# Patient Record
Sex: MALE | Race: WHITE | Employment: OTHER | URBAN - NONMETROPOLITAN AREA
[De-identification: names, ages, dates, MRNs, and addresses within clinical notes are randomized per-mention and may not be internally consistent; named-entity substitution may affect disease eponyms.]

---

## 2017-04-20 ENCOUNTER — TELEPHONE (OUTPATIENT)
Dept: PRIMARY CARE CLINIC | Age: 67
End: 2017-04-20

## 2017-05-17 ENCOUNTER — OFFICE VISIT (OUTPATIENT)
Dept: PRIMARY CARE CLINIC | Age: 67
End: 2017-05-17
Payer: COMMERCIAL

## 2017-05-17 VITALS
BODY MASS INDEX: 26.49 KG/M2 | OXYGEN SATURATION: 98 % | HEIGHT: 72 IN | WEIGHT: 195.6 LBS | TEMPERATURE: 97.8 F | SYSTOLIC BLOOD PRESSURE: 138 MMHG | HEART RATE: 61 BPM | DIASTOLIC BLOOD PRESSURE: 84 MMHG

## 2017-05-17 DIAGNOSIS — Z11.59 NEED FOR HEPATITIS C SCREENING TEST: ICD-10-CM

## 2017-05-17 DIAGNOSIS — E78.2 MIXED HYPERLIPIDEMIA: ICD-10-CM

## 2017-05-17 DIAGNOSIS — K21.9 GASTROESOPHAGEAL REFLUX DISEASE WITHOUT ESOPHAGITIS: ICD-10-CM

## 2017-05-17 DIAGNOSIS — Z23 NEED FOR TDAP VACCINATION: ICD-10-CM

## 2017-05-17 DIAGNOSIS — I10 ESSENTIAL HYPERTENSION: ICD-10-CM

## 2017-05-17 DIAGNOSIS — Z12.5 SCREENING PSA (PROSTATE SPECIFIC ANTIGEN): ICD-10-CM

## 2017-05-17 DIAGNOSIS — E78.00 PURE HYPERCHOLESTEROLEMIA: ICD-10-CM

## 2017-05-17 DIAGNOSIS — I10 ESSENTIAL HYPERTENSION: Primary | ICD-10-CM

## 2017-05-17 DIAGNOSIS — Z23 NEED FOR PNEUMOCOCCAL VACCINE: ICD-10-CM

## 2017-05-17 LAB
ALBUMIN SERPL-MCNC: 4.7 G/DL (ref 3.5–5.2)
ALP BLD-CCNC: 60 U/L (ref 40–130)
ALT SERPL-CCNC: 31 U/L (ref 5–41)
ANION GAP SERPL CALCULATED.3IONS-SCNC: 15 MMOL/L (ref 7–19)
AST SERPL-CCNC: 33 U/L (ref 5–40)
BILIRUB SERPL-MCNC: 1 MG/DL (ref 0.2–1.2)
BUN BLDV-MCNC: 13 MG/DL (ref 8–23)
CALCIUM SERPL-MCNC: 9.3 MG/DL (ref 8.8–10.2)
CHLORIDE BLD-SCNC: 104 MMOL/L (ref 98–111)
CHOLESTEROL, TOTAL: 129 MG/DL (ref 160–199)
CO2: 23 MMOL/L (ref 22–29)
CREAT SERPL-MCNC: 0.9 MG/DL (ref 0.5–1.2)
GFR NON-AFRICAN AMERICAN: >60
GLUCOSE BLD-MCNC: 104 MG/DL (ref 74–109)
HAV IGM SER IA-ACNC: NORMAL
HDLC SERPL-MCNC: 44 MG/DL (ref 55–121)
HEPATITIS B CORE IGM ANTIBODY: NORMAL
HEPATITIS B SURFACE ANTIGEN INTERPRETATION: NORMAL
HEPATITIS C ANTIBODY INTERPRETATION: NORMAL
LDL CHOLESTEROL CALCULATED: 68 MG/DL
POTASSIUM SERPL-SCNC: 4.3 MMOL/L (ref 3.5–5)
PROSTATE SPECIFIC ANTIGEN: 1.49 NG/ML (ref 0–4)
SODIUM BLD-SCNC: 142 MMOL/L (ref 136–145)
TOTAL PROTEIN: 6.8 G/DL (ref 6.6–8.7)
TRIGL SERPL-MCNC: 85 MG/DL (ref 150–199)
TSH SERPL DL<=0.05 MIU/L-ACNC: 1.99 UIU/ML (ref 0.27–4.2)

## 2017-05-17 PROCEDURE — 90471 IMMUNIZATION ADMIN: CPT | Performed by: FAMILY MEDICINE

## 2017-05-17 PROCEDURE — 90472 IMMUNIZATION ADMIN EACH ADD: CPT | Performed by: FAMILY MEDICINE

## 2017-05-17 PROCEDURE — 90715 TDAP VACCINE 7 YRS/> IM: CPT | Performed by: FAMILY MEDICINE

## 2017-05-17 PROCEDURE — 90670 PCV13 VACCINE IM: CPT | Performed by: FAMILY MEDICINE

## 2017-05-17 PROCEDURE — 99214 OFFICE O/P EST MOD 30 MIN: CPT | Performed by: FAMILY MEDICINE

## 2017-05-17 RX ORDER — CHLORDIAZEPOXIDE HYDROCHLORIDE AND CLIDINIUM BROMIDE 5; 2.5 MG/1; MG/1
CAPSULE ORAL
Refills: 2 | COMMUNITY
Start: 2017-04-17 | End: 2020-07-10 | Stop reason: SDUPTHER

## 2017-05-17 RX ORDER — LISINOPRIL 5 MG/1
TABLET ORAL
Qty: 90 TABLET | Refills: 3 | Status: SHIPPED | OUTPATIENT
Start: 2017-05-17 | End: 2017-05-17 | Stop reason: SDUPTHER

## 2017-05-17 RX ORDER — EZETIMIBE AND SIMVASTATIN 10; 40 MG/1; MG/1
1 TABLET ORAL NIGHTLY
Qty: 90 TABLET | Refills: 3 | Status: SHIPPED | OUTPATIENT
Start: 2017-05-17 | End: 2017-05-17 | Stop reason: SDUPTHER

## 2017-05-17 RX ORDER — EZETIMIBE AND SIMVASTATIN 10; 40 MG/1; MG/1
1 TABLET ORAL NIGHTLY
Qty: 90 TABLET | Refills: 3 | Status: SHIPPED | OUTPATIENT
Start: 2017-05-17 | End: 2017-07-31 | Stop reason: SDUPTHER

## 2017-05-17 RX ORDER — LISINOPRIL 5 MG/1
TABLET ORAL
Qty: 90 TABLET | Refills: 3 | Status: SHIPPED | OUTPATIENT
Start: 2017-05-17 | End: 2018-05-17 | Stop reason: SDUPTHER

## 2017-05-17 ASSESSMENT — ENCOUNTER SYMPTOMS
COUGH: 0
SHORTNESS OF BREATH: 0
COLOR CHANGE: 0

## 2017-05-22 ENCOUNTER — TELEPHONE (OUTPATIENT)
Dept: PRIMARY CARE CLINIC | Age: 67
End: 2017-05-22

## 2017-07-31 DIAGNOSIS — E78.2 MIXED HYPERLIPIDEMIA: ICD-10-CM

## 2017-07-31 DIAGNOSIS — E78.00 PURE HYPERCHOLESTEROLEMIA: ICD-10-CM

## 2017-07-31 RX ORDER — EZETIMIBE AND SIMVASTATIN 10; 40 MG/1; MG/1
1 TABLET ORAL NIGHTLY
Qty: 90 TABLET | Refills: 3 | Status: SHIPPED | OUTPATIENT
Start: 2017-07-31 | End: 2018-05-17 | Stop reason: SDUPTHER

## 2018-05-17 ENCOUNTER — OFFICE VISIT (OUTPATIENT)
Dept: PRIMARY CARE CLINIC | Age: 68
End: 2018-05-17
Payer: COMMERCIAL

## 2018-05-17 ENCOUNTER — TELEPHONE (OUTPATIENT)
Dept: PRIMARY CARE CLINIC | Age: 68
End: 2018-05-17

## 2018-05-17 VITALS
HEART RATE: 68 BPM | SYSTOLIC BLOOD PRESSURE: 122 MMHG | TEMPERATURE: 97.3 F | OXYGEN SATURATION: 97 % | WEIGHT: 192 LBS | HEIGHT: 72 IN | BODY MASS INDEX: 26.01 KG/M2 | DIASTOLIC BLOOD PRESSURE: 70 MMHG

## 2018-05-17 DIAGNOSIS — I10 ESSENTIAL HYPERTENSION: ICD-10-CM

## 2018-05-17 DIAGNOSIS — E78.2 MIXED HYPERLIPIDEMIA: ICD-10-CM

## 2018-05-17 DIAGNOSIS — Z12.5 SCREENING PSA (PROSTATE SPECIFIC ANTIGEN): ICD-10-CM

## 2018-05-17 DIAGNOSIS — Z23 NEED FOR PROPHYLACTIC VACCINATION AGAINST STREPTOCOCCUS PNEUMONIAE (PNEUMOCOCCUS): ICD-10-CM

## 2018-05-17 DIAGNOSIS — E78.00 PURE HYPERCHOLESTEROLEMIA: ICD-10-CM

## 2018-05-17 DIAGNOSIS — Z00.00 WELLNESS EXAMINATION: Primary | ICD-10-CM

## 2018-05-17 LAB
ALBUMIN SERPL-MCNC: 4.8 G/DL (ref 3.5–5.2)
ALP BLD-CCNC: 59 U/L (ref 40–130)
ALT SERPL-CCNC: 29 U/L (ref 5–41)
ANION GAP SERPL CALCULATED.3IONS-SCNC: 14 MMOL/L (ref 7–19)
AST SERPL-CCNC: 22 U/L (ref 5–40)
BILIRUB SERPL-MCNC: 0.9 MG/DL (ref 0.2–1.2)
BUN BLDV-MCNC: 14 MG/DL (ref 8–23)
CALCIUM SERPL-MCNC: 9.4 MG/DL (ref 8.8–10.2)
CHLORIDE BLD-SCNC: 101 MMOL/L (ref 98–111)
CHOLESTEROL, TOTAL: 142 MG/DL (ref 160–199)
CO2: 25 MMOL/L (ref 22–29)
CREAT SERPL-MCNC: 1 MG/DL (ref 0.5–1.2)
GFR NON-AFRICAN AMERICAN: >60
GLUCOSE BLD-MCNC: 111 MG/DL (ref 74–109)
HDLC SERPL-MCNC: 46 MG/DL (ref 55–121)
LDL CHOLESTEROL CALCULATED: 80 MG/DL
POTASSIUM SERPL-SCNC: 4.2 MMOL/L (ref 3.5–5)
PROSTATE SPECIFIC ANTIGEN: 3.5 NG/ML (ref 0–4)
SODIUM BLD-SCNC: 140 MMOL/L (ref 136–145)
TOTAL PROTEIN: 7.3 G/DL (ref 6.6–8.7)
TRIGL SERPL-MCNC: 78 MG/DL (ref 0–149)

## 2018-05-17 PROCEDURE — 99214 OFFICE O/P EST MOD 30 MIN: CPT | Performed by: FAMILY MEDICINE

## 2018-05-17 PROCEDURE — 90732 PPSV23 VACC 2 YRS+ SUBQ/IM: CPT | Performed by: FAMILY MEDICINE

## 2018-05-17 PROCEDURE — 90471 IMMUNIZATION ADMIN: CPT | Performed by: FAMILY MEDICINE

## 2018-05-17 RX ORDER — EZETIMIBE AND SIMVASTATIN 10; 40 MG/1; MG/1
1 TABLET ORAL NIGHTLY
Qty: 90 TABLET | Refills: 3 | Status: SHIPPED | OUTPATIENT
Start: 2018-05-17 | End: 2018-12-07 | Stop reason: SINTOL

## 2018-05-17 RX ORDER — LISINOPRIL 5 MG/1
5 TABLET ORAL DAILY
Qty: 90 TABLET | Refills: 3 | Status: SHIPPED | OUTPATIENT
Start: 2018-05-17 | End: 2019-05-20 | Stop reason: SDUPTHER

## 2018-05-17 ASSESSMENT — ENCOUNTER SYMPTOMS
NAUSEA: 0
WHEEZING: 0
SHORTNESS OF BREATH: 0
EYE PAIN: 0
BLOOD IN STOOL: 0
ANAL BLEEDING: 0
COLOR CHANGE: 0
VOMITING: 0

## 2018-05-17 ASSESSMENT — PATIENT HEALTH QUESTIONNAIRE - PHQ9
1. LITTLE INTEREST OR PLEASURE IN DOING THINGS: 0
SUM OF ALL RESPONSES TO PHQ QUESTIONS 1-9: 0
2. FEELING DOWN, DEPRESSED OR HOPELESS: 0
SUM OF ALL RESPONSES TO PHQ9 QUESTIONS 1 & 2: 0

## 2018-08-17 ENCOUNTER — OFFICE VISIT (OUTPATIENT)
Dept: PRIMARY CARE CLINIC | Age: 68
End: 2018-08-17
Payer: COMMERCIAL

## 2018-08-17 VITALS
WEIGHT: 197 LBS | DIASTOLIC BLOOD PRESSURE: 72 MMHG | TEMPERATURE: 97.4 F | BODY MASS INDEX: 26.68 KG/M2 | OXYGEN SATURATION: 97 % | HEART RATE: 65 BPM | HEIGHT: 72 IN | SYSTOLIC BLOOD PRESSURE: 130 MMHG | RESPIRATION RATE: 16 BRPM

## 2018-08-17 DIAGNOSIS — R10.9 FLANK PAIN: ICD-10-CM

## 2018-08-17 DIAGNOSIS — Z87.438 HISTORY OF PROSTATITIS: ICD-10-CM

## 2018-08-17 DIAGNOSIS — R10.30 LOWER ABDOMINAL PAIN: ICD-10-CM

## 2018-08-17 DIAGNOSIS — Z23 NEED FOR ZOSTER VACCINATION: ICD-10-CM

## 2018-08-17 DIAGNOSIS — R73.09 ELEVATED GLUCOSE: ICD-10-CM

## 2018-08-17 DIAGNOSIS — R30.0 DYSURIA: Primary | ICD-10-CM

## 2018-08-17 DIAGNOSIS — R30.0 DYSURIA: ICD-10-CM

## 2018-08-17 LAB
APPEARANCE FLUID: CLEAR
BILIRUBIN, POC: NEGATIVE
BLOOD URINE, POC: NEGATIVE
CLARITY, POC: CLEAR
COLOR, POC: NORMAL
GLUCOSE URINE, POC: NEGATIVE
HBA1C MFR BLD: 6.1 % (ref 4–6)
HCT VFR BLD CALC: 45 % (ref 42–52)
HEMOGLOBIN: 14.5 G/DL (ref 14–18)
KETONES, POC: NEGATIVE
LEUKOCYTE EST, POC: NEGATIVE
MCH RBC QN AUTO: 29.2 PG (ref 27–31)
MCHC RBC AUTO-ENTMCNC: 32.2 G/DL (ref 33–37)
MCV RBC AUTO: 90.5 FL (ref 80–94)
NITRITE, POC: NEGATIVE
PDW BLD-RTO: 13.2 % (ref 11.5–14.5)
PH, POC: 6
PLATELET # BLD: 249 K/UL (ref 130–400)
PMV BLD AUTO: 9.3 FL (ref 9.4–12.4)
PROSTATE SPECIFIC ANTIGEN: 1.56 NG/ML (ref 0–4)
PROTEIN, POC: NEGATIVE
RBC # BLD: 4.97 M/UL (ref 4.7–6.1)
SPECIFIC GRAVITY, POC: 1.03
UROBILINOGEN, POC: 0.2
WBC # BLD: 5.2 K/UL (ref 4.8–10.8)

## 2018-08-17 PROCEDURE — 99213 OFFICE O/P EST LOW 20 MIN: CPT | Performed by: NURSE PRACTITIONER

## 2018-08-17 PROCEDURE — 81002 URINALYSIS NONAUTO W/O SCOPE: CPT | Performed by: NURSE PRACTITIONER

## 2018-08-17 RX ORDER — GREEN TEA/HOODIA GORDONII 315-12.5MG
1 CAPSULE ORAL DAILY
Qty: 30 TABLET | Refills: 0 | Status: SHIPPED | OUTPATIENT
Start: 2018-08-17 | End: 2019-05-20 | Stop reason: ALTCHOICE

## 2018-08-17 RX ORDER — CIPROFLOXACIN 500 MG/1
500 TABLET, FILM COATED ORAL 2 TIMES DAILY
Qty: 28 TABLET | Refills: 0 | Status: SHIPPED | OUTPATIENT
Start: 2018-08-17 | End: 2018-08-31

## 2018-08-17 ASSESSMENT — ENCOUNTER SYMPTOMS
BACK PAIN: 0
ABDOMINAL PAIN: 1

## 2018-08-17 NOTE — PROGRESS NOTES
[address] [address]  Phone:  [phone]  Fax:  [phone]      Therese Avila is a 76 y.o. male who presents today for his medical conditions/complaints as noted below. Therese vAila is c/o of [facility] Lauren Aldana i have a prostate infection\")      Chief Complaint   Patient presents with    [facility]     Jori Becker i have a prostate infection\"       HPI:     HPI    Therese Avila presents today to discuss results of PSA and he thinks he has a prostate infection. He has left lower back pain, lower abdominal pain when he bends over, and pain at tip of penis when urinating. He denies issues with urine flow, decreased output, fevers, nausea, vomiting, or diarrhea. This has been present for a few weeks. He does have a history of diverticulitis. He denies any new sexual partners. Past Medical History:   Diagnosis Date    Gallstones     GERD (gastroesophageal reflux disease)     Gout     HSN type I     HTN (hypertension)     Hyperlipidemia     Cholesterol management per pcp     Irritable bowel syndrome     Osteoarthritis         Past Surgical History:   Procedure Laterality Date    APPENDECTOMY      CHOLECYSTECTOMY      COLONOSCOPY  2014    Dr. Keon Lott Right        Social History   Substance Use Topics    Smoking status: Never Smoker    Smokeless tobacco: Never Used    Alcohol use No        Current Outpatient Prescriptions   Medication Sig Dispense Refill    zoster recombinant adjuvanted vaccine (SHINGRIX) 50 MCG SUSR injection Inject 0.5 mLs into the muscle once for 1 dose One dose now, next dose in 2-6 months.  0.5 mL 1    ciprofloxacin (CIPRO) 500 MG tablet Take 1 tablet by mouth 2 times daily for 14 days 28 tablet 0    Lactobacillus (PROBIOTIC ACIDOPHILUS) TABS Take 1 tablet by mouth daily 30 tablet 0    ezetimibe-simvastatin (VYTORIN) 10-40 MG per tablet Take 1 tablet by mouth nightly 90 tablet 3 find it unlikely that he has prostatitis due to exam.     Will consider urology consult if not improved after cipro treatment. Return in about 3 weeks (around 9/7/2018), or if symptoms worsen or fail to improve. Orders Placed This Encounter   Procedures    Urine Culture     Standing Status:   Future     Number of Occurrences:   1     Standing Expiration Date:   8/17/2019     Order Specific Question:   Specify (ex-cath, midstream, cysto, etc)? Answer:   midstream    CT ABDOMEN PELVIS W WO CONTRAST Additional Contrast? Radiologist Recommendation     Standing Status:   Future     Number of Occurrences:   1     Standing Expiration Date:   8/17/2019     Order Specific Question:   Additional Contrast?     Answer:   Radiologist Recommendation     Order Specific Question:   Reason for exam:     Answer:   lower abdominal, ll back pain, dysuria    CBC     Standing Status:   Future     Number of Occurrences:   1     Standing Expiration Date:   8/17/2019    Hemoglobin A1C     Standing Status:   Future     Number of Occurrences:   1     Standing Expiration Date:   8/17/2019    PSA Screening     Standing Status:   Future     Number of Occurrences:   1     Standing Expiration Date:   8/17/2019    POCT Urinalysis no Micro       Orders Placed This Encounter   Medications    zoster recombinant adjuvanted vaccine (SHINGRIX) 50 MCG SUSR injection     Sig: Inject 0.5 mLs into the muscle once for 1 dose One dose now, next dose in 2-6 months. Dispense:  0.5 mL     Refill:  1    ciprofloxacin (CIPRO) 500 MG tablet     Sig: Take 1 tablet by mouth 2 times daily for 14 days     Dispense:  28 tablet     Refill:  0    Lactobacillus (PROBIOTIC ACIDOPHILUS) TABS     Sig: Take 1 tablet by mouth daily     Dispense:  30 tablet     Refill:  0        Patient offered educational materials - see patient instructions. Discussed use, benefit, and side effects of prescribed medications. All patient questions answered.   Pt voiced

## 2018-08-19 LAB — URINE CULTURE, ROUTINE: NORMAL

## 2018-08-20 ENCOUNTER — TELEPHONE (OUTPATIENT)
Dept: PRIMARY CARE CLINIC | Age: 68
End: 2018-08-20

## 2018-08-24 ENCOUNTER — HOSPITAL ENCOUNTER (OUTPATIENT)
Dept: CT IMAGING | Age: 68
Discharge: HOME | End: 2018-08-24
Payer: MEDICARE

## 2018-08-24 LAB
GFR NON-AFRICAN AMERICAN: 55
PERFORMED ON: ABNORMAL
POC CREATININE: 1.3 MG/DL (ref 0.3–1.3)
POC SAMPLE TYPE: ABNORMAL

## 2018-08-24 PROCEDURE — 6360000004 HC RX CONTRAST MEDICATION: Performed by: NURSE PRACTITIONER

## 2018-08-24 PROCEDURE — 74178 CT ABD&PLV WO CNTR FLWD CNTR: CPT

## 2018-08-24 PROCEDURE — 82565 ASSAY OF CREATININE: CPT

## 2018-08-24 RX ADMIN — IOPAMIDOL 75 ML: 755 INJECTION, SOLUTION INTRAVENOUS at 12:41

## 2018-08-27 ENCOUNTER — TELEPHONE (OUTPATIENT)
Dept: PRIMARY CARE CLINIC | Age: 68
End: 2018-08-27

## 2018-08-27 NOTE — TELEPHONE ENCOUNTER
Called pt about his CT results. Spoke with Dlela pt wife who is on his HIPPA she stated understanding and will inform the pt about his results. Your CT of the abdomen showed a fatty liver. A clean, low saturated fat diet and increased activity can help with this. A high fiber diet is recommended. Avoiding diets high in seeds and nuts can also be helpful.

## 2018-09-28 ENCOUNTER — OFFICE VISIT (OUTPATIENT)
Dept: PRIMARY CARE CLINIC | Age: 68
End: 2018-09-28
Payer: MEDICARE

## 2018-09-28 VITALS
WEIGHT: 193.4 LBS | OXYGEN SATURATION: 98 % | DIASTOLIC BLOOD PRESSURE: 76 MMHG | SYSTOLIC BLOOD PRESSURE: 124 MMHG | HEIGHT: 72 IN | HEART RATE: 64 BPM | TEMPERATURE: 97.5 F | BODY MASS INDEX: 26.19 KG/M2

## 2018-09-28 DIAGNOSIS — Z23 ENCOUNTER FOR VACCINATION: ICD-10-CM

## 2018-09-28 DIAGNOSIS — Z09 FOLLOW-UP EXAM: Primary | ICD-10-CM

## 2018-09-28 PROCEDURE — G0008 ADMIN INFLUENZA VIRUS VAC: HCPCS | Performed by: NURSE PRACTITIONER

## 2018-09-28 PROCEDURE — 90662 IIV NO PRSV INCREASED AG IM: CPT | Performed by: NURSE PRACTITIONER

## 2018-09-28 PROCEDURE — 99212 OFFICE O/P EST SF 10 MIN: CPT | Performed by: NURSE PRACTITIONER

## 2018-09-28 ASSESSMENT — ENCOUNTER SYMPTOMS: BACK PAIN: 0

## 2018-09-28 NOTE — PROGRESS NOTES
After obtaining consent, and per orders of Arpita Rosas, injection of Flu given in Left arm by Ashley Longo. Patient instructed to remain in clinic for 20 minutes afterwards, and to report any adverse reaction to me immediately.

## 2018-12-07 ENCOUNTER — TELEPHONE (OUTPATIENT)
Dept: PRIMARY CARE CLINIC | Age: 68
End: 2018-12-07

## 2018-12-07 RX ORDER — EZETIMIBE AND SIMVASTATIN 10; 20 MG/1; MG/1
1 TABLET ORAL NIGHTLY
Qty: 30 TABLET | Refills: 3 | Status: SHIPPED | OUTPATIENT
Start: 2018-12-07 | End: 2018-12-10 | Stop reason: SDUPTHER

## 2018-12-10 RX ORDER — EZETIMIBE AND SIMVASTATIN 10; 20 MG/1; MG/1
1 TABLET ORAL NIGHTLY
Qty: 30 TABLET | Refills: 3 | Status: SHIPPED | OUTPATIENT
Start: 2018-12-10 | End: 2019-05-20 | Stop reason: DRUGHIGH

## 2019-04-22 DIAGNOSIS — E78.00 PURE HYPERCHOLESTEROLEMIA: ICD-10-CM

## 2019-04-22 DIAGNOSIS — E78.2 MIXED HYPERLIPIDEMIA: ICD-10-CM

## 2019-04-22 RX ORDER — EZETIMIBE AND SIMVASTATIN 10; 40 MG/1; MG/1
1 TABLET ORAL NIGHTLY
Qty: 90 TABLET | Refills: 1 | Status: SHIPPED | OUTPATIENT
Start: 2019-04-22 | End: 2019-05-20 | Stop reason: SDUPTHER

## 2019-05-20 ENCOUNTER — OFFICE VISIT (OUTPATIENT)
Dept: PRIMARY CARE CLINIC | Age: 69
End: 2019-05-20
Payer: MEDICARE

## 2019-05-20 VITALS
BODY MASS INDEX: 26.44 KG/M2 | HEART RATE: 68 BPM | HEIGHT: 72 IN | SYSTOLIC BLOOD PRESSURE: 118 MMHG | OXYGEN SATURATION: 98 % | DIASTOLIC BLOOD PRESSURE: 80 MMHG | TEMPERATURE: 98.2 F | WEIGHT: 195.2 LBS

## 2019-05-20 DIAGNOSIS — I10 ESSENTIAL HYPERTENSION: ICD-10-CM

## 2019-05-20 DIAGNOSIS — Z78.9 IMMUNE TO MEASLES: ICD-10-CM

## 2019-05-20 DIAGNOSIS — E78.00 PURE HYPERCHOLESTEROLEMIA: ICD-10-CM

## 2019-05-20 DIAGNOSIS — Z00.00 ROUTINE GENERAL MEDICAL EXAMINATION AT A HEALTH CARE FACILITY: Primary | ICD-10-CM

## 2019-05-20 LAB
ALBUMIN SERPL-MCNC: 4.7 G/DL (ref 3.5–5.2)
ALP BLD-CCNC: 59 U/L (ref 40–130)
ALT SERPL-CCNC: 26 U/L (ref 5–41)
ANION GAP SERPL CALCULATED.3IONS-SCNC: 11 MMOL/L (ref 7–19)
AST SERPL-CCNC: 24 U/L (ref 5–40)
BILIRUB SERPL-MCNC: 0.8 MG/DL (ref 0.2–1.2)
BUN BLDV-MCNC: 17 MG/DL (ref 8–23)
CALCIUM SERPL-MCNC: 9.5 MG/DL (ref 8.8–10.2)
CHLORIDE BLD-SCNC: 105 MMOL/L (ref 98–111)
CHOLESTEROL, TOTAL: 125 MG/DL (ref 160–199)
CO2: 26 MMOL/L (ref 22–29)
CREAT SERPL-MCNC: 1 MG/DL (ref 0.5–1.2)
GFR NON-AFRICAN AMERICAN: >60
GLUCOSE BLD-MCNC: 113 MG/DL (ref 74–109)
HDLC SERPL-MCNC: 36 MG/DL (ref 55–121)
LDL CHOLESTEROL CALCULATED: 70 MG/DL
POTASSIUM SERPL-SCNC: 4.7 MMOL/L (ref 3.5–5)
SODIUM BLD-SCNC: 142 MMOL/L (ref 136–145)
TOTAL PROTEIN: 7.3 G/DL (ref 6.6–8.7)
TRIGL SERPL-MCNC: 93 MG/DL (ref 0–149)

## 2019-05-20 PROCEDURE — G0438 PPPS, INITIAL VISIT: HCPCS | Performed by: FAMILY MEDICINE

## 2019-05-20 RX ORDER — LISINOPRIL 5 MG/1
5 TABLET ORAL DAILY
Qty: 90 TABLET | Refills: 3 | Status: SHIPPED | OUTPATIENT
Start: 2019-05-20 | End: 2020-06-17 | Stop reason: SDUPTHER

## 2019-05-20 RX ORDER — EZETIMIBE AND SIMVASTATIN 10; 40 MG/1; MG/1
1 TABLET ORAL NIGHTLY
Qty: 90 TABLET | Refills: 1 | Status: SHIPPED | OUTPATIENT
Start: 2019-05-20 | End: 2020-03-22

## 2019-05-20 ASSESSMENT — PATIENT HEALTH QUESTIONNAIRE - PHQ9
SUM OF ALL RESPONSES TO PHQ QUESTIONS 1-9: 0
SUM OF ALL RESPONSES TO PHQ QUESTIONS 1-9: 0

## 2019-05-20 ASSESSMENT — ANXIETY QUESTIONNAIRES: GAD7 TOTAL SCORE: 0

## 2019-05-20 ASSESSMENT — LIFESTYLE VARIABLES: HOW OFTEN DO YOU HAVE A DRINK CONTAINING ALCOHOL: 0

## 2019-05-20 NOTE — PROGRESS NOTES
care):   Patient Care Team:  Helena Mistry MD as PCP - General (Family Medicine)  Jeff Raymond MD (Cardiology)    Wt Readings from Last 3 Encounters:   05/20/19 195 lb 3.2 oz (88.5 kg)   09/28/18 193 lb 6.4 oz (87.7 kg)   08/17/18 197 lb (89.4 kg)     Vitals:    05/20/19 0845   BP: 118/80   Pulse: 68   Temp: 98.2 °F (36.8 °C)   SpO2: 98%   Weight: 195 lb 3.2 oz (88.5 kg)   Height: 6' (1.829 m)     Body mass index is 26.47 kg/m². Based upon direct observation of the patient, evaluation of cognition reveals recent and remote memory intact. General Appearance: alert and oriented to person, place and time, well developed and well- nourished, in no acute distress  Skin: warm and dry, no rash or erythema  Head: normocephalic and atraumatic  Eyes: pupils equal, round, and reactive to light, extraocular eye movements intact, conjunctivae normal  ENT: tympanic membrane, external ear and ear canal normal bilaterally, nose without deformity, nasal mucosa and turbinates normal without polyps  Neck: supple and non-tender without mass, no thyromegaly or thyroid nodules, no cervical lymphadenopathy  Pulmonary/Chest: clear to auscultation bilaterally- no wheezes, rales or rhonchi, normal air movement, no respiratory distress  Cardiovascular: normal rate, regular rhythm, normal S1 and S2, no murmurs, rubs, clicks, or gallops, distal pulses intact, no carotid bruits  Abdomen: soft, non-tender, non-distended, normal bowel sounds, no masses or organomegaly  Extremities: no cyanosis, clubbing or edema  Musculoskeletal: normal range of motion, no joint swelling, deformity or tenderness  Neurologic: reflexes normal and symmetric, no cranial nerve deficit, gait, coordination and speech normal    Patient's complete Health Risk Assessment and screening values have been reviewed and are found in Flowsheets.  The following problems were reviewed today and where indicated follow up appointments were made and/or referrals ordered. Positive Risk Factor Screenings with Interventions:     No Positive Risk Factors identified today. Personalized Preventive Plan   Current Health Maintenance Status  Immunization History   Administered Date(s) Administered    Influenza, High Dose (Fluzone 65 yrs and older) 09/28/2018    Pneumococcal 13-valent Conjugate ([id number]) 05/17/2017    Pneumococcal Polysaccharide ([id number]) 05/17/2018    Tdap (Boostrix, Adacel) 05/17/2017    Zoster Live (Zostavax) 05/17/2011    Zoster Subunit (Shingrix) 05/15/2019        Health Maintenance   Topic Date Due    Potassium monitoring  05/17/2019    Creatinine monitoring  05/17/2019    Shingles Vaccine (3 of 3) 07/10/2019    A1C test (Diabetic or Prediabetic)  08/17/2019    Lipid screen  05/17/2023    Colon cancer screen colonoscopy  05/16/2025    DTaP/Tdap/Td vaccine (2 - Td) 05/17/2027    Flu vaccine  Completed    Pneumococcal 65+ years Vaccine  Completed    Hepatitis C screen  Addressed     Recommendations for Preventive Services Due: see orders and patient instructions/AVS.  .   Recommended screening schedule for the next 5-10 years is provided to the patient in written form: see Patient Instructions/AVS.

## 2019-05-20 NOTE — PATIENT INSTRUCTIONS
Personalized Preventive Plan for Claudia Power - 5/20/2019  Medicare offers a range of preventive health benefits. Some of the tests and screenings are paid in full while other may be subject to a deductible, co-insurance, and/or copay. Some of these benefits include a comprehensive review of your medical history including lifestyle, illnesses that may run in your family, and various assessments and screenings as appropriate. After reviewing your medical record and screening and assessments performed today your provider may have ordered immunizations, labs, imaging, and/or referrals for you. A list of these orders (if applicable) as well as your Preventive Care list are included within your After Visit Summary for your review. Other Preventive Recommendations:    · A preventive eye exam performed by an eye specialist is recommended every 1-2 years to screen for glaucoma; cataracts, macular degeneration, and other eye disorders. · A preventive dental visit is recommended every 6 months. · Try to get at least 150 minutes of exercise per week or 10,000 steps per day on a pedometer . · Order or download the FREE \"Exercise & Physical Activity: Your Everyday Guide\" from [employer]. Call 1-314.501.8471 or search [employer] online. · You need 1913-1349 mg of calcium and 3595-6459 IU of vitamin D per day. It is possible to meet your calcium requirement with diet alone, but a vitamin D supplement is usually necessary to meet this goal.  · When exposed to the sun, use a sunscreen that protects against both UVA and UVB radiation with an SPF of 30 or greater. Reapply every 2 to 3 hours or after sweating, drying off with a towel, or swimming. · Always wear a seat belt when traveling in a car. Always wear a helmet when riding a bicycle or motorcycle.

## 2019-05-22 LAB — RUBEOLA (MEASLES) AB IGG: >300 AU/ML

## 2019-06-04 ENCOUNTER — TELEPHONE (OUTPATIENT)
Dept: PRIMARY CARE CLINIC | Age: 69
End: 2019-06-04

## 2019-06-04 NOTE — TELEPHONE ENCOUNTER
----- Message from VIKTOR Montes sent at 5/31/2019  4:23 PM CDT -----  Please notify patient of normal result. Looks like he is immune to measles. Labs unremarkable otherwise. No changes.

## 2019-06-04 NOTE — TELEPHONE ENCOUNTER
This [facility] called and gave the patients their lab results. Patient stated understanding.

## 2020-06-14 RX ORDER — EZETIMIBE AND SIMVASTATIN 10; 40 MG/1; MG/1
TABLET ORAL
Qty: 90 TABLET | Refills: 0 | Status: SHIPPED | OUTPATIENT
Start: 2020-06-14 | End: 2020-09-24

## 2020-06-17 RX ORDER — LISINOPRIL 5 MG/1
5 TABLET ORAL DAILY
Qty: 90 TABLET | Refills: 0 | Status: SHIPPED | OUTPATIENT
Start: 2020-06-17 | End: 2020-09-24

## 2020-07-10 ENCOUNTER — OFFICE VISIT (OUTPATIENT)
Dept: PRIMARY CARE CLINIC | Age: 70
End: 2020-07-10
Payer: MEDICARE

## 2020-07-10 VITALS
OXYGEN SATURATION: 97 % | HEIGHT: 72 IN | TEMPERATURE: 98.1 F | DIASTOLIC BLOOD PRESSURE: 84 MMHG | BODY MASS INDEX: 26.82 KG/M2 | HEART RATE: 66 BPM | WEIGHT: 198 LBS | RESPIRATION RATE: 20 BRPM | SYSTOLIC BLOOD PRESSURE: 138 MMHG

## 2020-07-10 DIAGNOSIS — I10 ESSENTIAL HYPERTENSION: ICD-10-CM

## 2020-07-10 DIAGNOSIS — Z12.5 SCREENING PSA (PROSTATE SPECIFIC ANTIGEN): ICD-10-CM

## 2020-07-10 DIAGNOSIS — E78.00 PURE HYPERCHOLESTEROLEMIA: ICD-10-CM

## 2020-07-10 DIAGNOSIS — R73.03 PREDIABETES: ICD-10-CM

## 2020-07-10 LAB
ALBUMIN SERPL-MCNC: 5.2 G/DL (ref 3.5–5.2)
ALP BLD-CCNC: 60 U/L (ref 40–130)
ALT SERPL-CCNC: 31 U/L (ref 5–41)
ANION GAP SERPL CALCULATED.3IONS-SCNC: 14 MMOL/L (ref 7–19)
AST SERPL-CCNC: 26 U/L (ref 5–40)
BILIRUB SERPL-MCNC: 0.8 MG/DL (ref 0.2–1.2)
BUN BLDV-MCNC: 15 MG/DL (ref 8–23)
CALCIUM SERPL-MCNC: 9.8 MG/DL (ref 8.8–10.2)
CHLORIDE BLD-SCNC: 106 MMOL/L (ref 98–111)
CHOLESTEROL, FASTING: 112 MG/DL (ref 160–199)
CO2: 24 MMOL/L (ref 22–29)
CREAT SERPL-MCNC: 1 MG/DL (ref 0.5–1.2)
GFR NON-AFRICAN AMERICAN: >60
GLUCOSE BLD-MCNC: 110 MG/DL (ref 74–109)
HBA1C MFR BLD: 6.1 % (ref 4–6)
HCT VFR BLD CALC: 46.5 % (ref 42–52)
HDLC SERPL-MCNC: 39 MG/DL (ref 55–121)
HEMOGLOBIN: 15.5 G/DL (ref 14–18)
LDL CHOLESTEROL CALCULATED: 56 MG/DL
MCH RBC QN AUTO: 30.2 PG (ref 27–31)
MCHC RBC AUTO-ENTMCNC: 33.3 G/DL (ref 33–37)
MCV RBC AUTO: 90.5 FL (ref 80–94)
PDW BLD-RTO: 13.2 % (ref 11.5–14.5)
PLATELET # BLD: 244 K/UL (ref 130–400)
PMV BLD AUTO: 9.8 FL (ref 9.4–12.4)
POTASSIUM SERPL-SCNC: 4.6 MMOL/L (ref 3.5–5)
PROSTATE SPECIFIC ANTIGEN: 1.59 NG/ML (ref 0–4)
RBC # BLD: 5.14 M/UL (ref 4.7–6.1)
SODIUM BLD-SCNC: 144 MMOL/L (ref 136–145)
TOTAL PROTEIN: 7.4 G/DL (ref 6.6–8.7)
TRIGLYCERIDE, FASTING: 87 MG/DL (ref 0–149)
WBC # BLD: 6 K/UL (ref 4.8–10.8)

## 2020-07-10 PROCEDURE — G0439 PPPS, SUBSEQ VISIT: HCPCS | Performed by: NURSE PRACTITIONER

## 2020-07-10 RX ORDER — CHLORDIAZEPOXIDE HYDROCHLORIDE AND CLIDINIUM BROMIDE 5; 2.5 MG/1; MG/1
1 CAPSULE ORAL DAILY
Qty: 90 CAPSULE | Refills: 3 | Status: SHIPPED | OUTPATIENT
Start: 2020-07-10 | End: 2021-07-16 | Stop reason: SDUPTHER

## 2020-07-10 ASSESSMENT — LIFESTYLE VARIABLES: HOW OFTEN DO YOU HAVE A DRINK CONTAINING ALCOHOL: 0

## 2020-07-10 ASSESSMENT — PATIENT HEALTH QUESTIONNAIRE - PHQ9
SUM OF ALL RESPONSES TO PHQ QUESTIONS 1-9: 0
SUM OF ALL RESPONSES TO PHQ QUESTIONS 1-9: 0

## 2020-07-10 NOTE — PATIENT INSTRUCTIONS
We are committed to providing you with the best care possible. In order to help us achieve these goals please remember to bring all medications, herbal products, and over the counter supplements with you to each visit. If your provider has ordered testing for you, please be sure to follow up with our office if you have not received results within 7 days after the testing took place. *If you receive a survey after visiting one of our offices, please take time to share your experience concerning your physician office visit. These surveys are confidential and no health information about you is shared. We are eager to improve for you and we are counting on your feedback to help make that happen. Personalized Preventive Plan for Candelario Jeffrey - 7/10/2020  Medicare offers a range of preventive health benefits. Some of the tests and screenings are paid in full while other may be subject to a deductible, co-insurance, and/or copay. Some of these benefits include a comprehensive review of your medical history including lifestyle, illnesses that may run in your family, and various assessments and screenings as appropriate. After reviewing your medical record and screening and assessments performed today your provider may have ordered immunizations, labs, imaging, and/or referrals for you. A list of these orders (if applicable) as well as your Preventive Care list are included within your After Visit Summary for your review. Other Preventive Recommendations:    · A preventive eye exam performed by an eye specialist is recommended every 1-2 years to screen for glaucoma; cataracts, macular degeneration, and other eye disorders. · A preventive dental visit is recommended every 6 months. · Try to get at least 150 minutes of exercise per week or 10,000 steps per day on a pedometer . · Order or download the FREE \"Exercise & Physical Activity: Your Everyday Guide\" from [employer].  Call 1-984.437.6109 or search The [employer] on [address] [place] [address]. · You need 1353-9959 mg of calcium and 5566-7424 IU of vitamin D per day. It is possible to meet your calcium requirement with diet alone, but a vitamin D supplement is usually necessary to meet this goal.  · When exposed to the sun, use a sunscreen that protects against both UVA and UVB radiation with an SPF of 30 or greater. Reapply every 2 to 3 hours or after sweating, drying off with a towel, or swimming. · Always wear a seat belt when traveling in a car. Always wear a helmet when riding a bicycle or motorcycle.

## 2020-07-10 NOTE — PROGRESS NOTES
Chief Complaint   Patient presents with   [facility] Medicare Annual Wellness Visit  Name: Phong France Date: 7/10/2020   MRN: [id number] Sex: Male   Age: 79 y.o. Ethnicity: Non-/Non    : 1950 Race: Kim Mathur is here for Medicare AWV    Screenings for behavioral, psychosocial and functional/safety risks, and cognitive dysfunction are all negative except as indicated below. These results, as well as other patient data from the [address] [facility] form, are documented in Flowsheets linked to this Encounter. No Known Allergies      Prior to Visit Medications    Medication Sig Taking? Authorizing Provider   chlordiazePOXIDE-clidinium (LIBRAX) 5-2.5 MG per capsule Take 1 capsule by mouth daily. Yes VIKTOR Goddard   lisinopril (PRINIVIL;ZESTRIL) 5 MG tablet Take 1 tablet by mouth daily Yes VIKTOR Goddard   ezetimibe-simvastatin (VYTORIN) 10-40 MG per tablet TAKE 1 TABLET BY MOUTH EVERY DAY Yes VIKTOR Goddard   dexlansoprazole (DEXILANT) 60 MG CPDR capsule Take 60 mg by mouth daily Yes Historical Provider, MD   aspirin 81 MG EC tablet Take 81 mg by mouth daily. Yes Historical Provider, MD   polycarbophil (FIBERCON) 625 MG tablet Take 625 mg by mouth daily.    Yes Historical Provider, MD         Past Medical History:   Diagnosis Date    Gallstones     GERD (gastroesophageal reflux disease)     Gout     HSN type I     HTN (hypertension)     Hyperlipidemia     Cholesterol management per pcp     Irritable bowel syndrome     Osteoarthritis        Past Surgical History:   Procedure Laterality Date    APPENDECTOMY      CHOLECYSTECTOMY      COLONOSCOPY      Dr. Robert Ley Right          Family History   Problem Relation Age of Onset    High Cholesterol Mother     Stroke Father     Other Father     Thyroid Disease Paternal Grandfather        CareTeam (Including outside providers/suppliers regularly involved in providing care):   Patient Care Team:  VIKTOR Posada as PCP - General (Nurse Practitioner)  VIKTOR Posada as PCP - [facility]  Jovani Guajardo MD (Cardiology)    Wt Readings from Last 3 Encounters:   07/10/20 198 lb (89.8 kg)   05/20/19 195 lb 3.2 oz (88.5 kg)   09/28/18 193 lb 6.4 oz (87.7 kg)     Vitals:    07/10/20 1112   BP: 138/84   Pulse: 66   Resp: 20   Temp: 98.1 °F (36.7 °C)   SpO2: 97%   Weight: 198 lb (89.8 kg)   Height: 6' (1.829 m)     Body mass index is 26.85 kg/m². Mr. Mahesh Egan presents today for an annual wellness exam.  He states he is doing well. He continues to follow-up with gastroenterology, dermatology, cardiology in [place]. He feels well today. He still works full-time. He is needing refills on Librax. Based upon direct observation of the patient, evaluation of cognition reveals recent and remote memory intact.     General Appearance: alert and oriented to person, place and time, well developed and well- nourished, in no acute distress  Skin: warm and dry, no rash or erythema  Head: normocephalic and atraumatic  Eyes: pupils equal, round, and reactive to light, extraocular eye movements intact, conjunctivae normal  ENT: tympanic membrane, external ear and ear canal normal bilaterally, nose without deformity, nasal mucosa and turbinates normal without polyps  Neck: supple and non-tender without mass, no thyromegaly or thyroid nodules, no cervical lymphadenopathy  Pulmonary/Chest: clear to auscultation bilaterally- no wheezes, rales or rhonchi, normal air movement, no respiratory distress  Cardiovascular: normal rate, regular rhythm, normal S1 and S2, no murmurs, rubs, clicks, or gallops, distal pulses intact, no carotid bruits  Abdomen: soft, non-tender, non-distended, normal bowel sounds, no masses or organomegaly  Extremities: no cyanosis, clubbing or edema  Musculoskeletal: normal range of motion, no joint swelling, deformity or tenderness  Neurologic: reflexes normal and symmetric, no cranial nerve deficit, gait, coordination and speech normal    Patient's complete Health Risk Assessment and screening values have been reviewed and are found in Flowsheets. The following problems were reviewed today and where indicated follow up appointments were made and/or referrals ordered. Positive Risk Factor Screenings with Interventions:     Depression:  Depression Unable to Assess: Functional capacity motivation limits accuracy  PHQ-2 Score: 0  PHQ-9 Total Score: 0    Personalized Preventive Plan   Current Health Maintenance Status  Immunization History   Administered Date(s) Administered    Influenza, High Dose (Fluzone 65 yrs and older) 09/28/2018    Pneumococcal Conjugate 13-valent ([id number]) 05/17/2017    Pneumococcal Polysaccharide ([id number]) 05/17/2018    Tdap (Boostrix, Adacel) 05/17/2017    Zoster Live (Zostavax) 05/17/2011    Zoster Recombinant (Shingrix) 05/15/2019, 10/10/2019        Health Maintenance   Topic Date Due    Annual Wellness Visit (AWV)  05/29/2019    A1C test (Diabetic or Prediabetic)  08/17/2019    Lipid screen  05/20/2020    Potassium monitoring  05/20/2020    Creatinine monitoring  05/20/2020    Flu vaccine (1) 09/01/2020    Colon cancer screen colonoscopy  05/16/2025    DTaP/Tdap/Td vaccine (2 - Td) 05/17/2027    Shingles Vaccine  Completed    Pneumococcal 65+ years Vaccine  Completed    Hepatitis C screen  Addressed    Hepatitis A vaccine  Aged Out    Hepatitis B vaccine  Aged Out    Hib vaccine  Aged Out    Meningococcal (ACWY) vaccine  Aged Out     Recommendations for [employer] Due: see orders and patient instructions/AVS.  . Recommended screening schedule for the next 5-10 years is provided to the patient in written form: see Patient Instructions/AVS.    Caroline Knox was seen today for medicare awv.     Diagnoses and all orders for this visit:    Routine general medical examination at a health care facility    Essential hypertension  -     CBC; Future  -     Comprehensive Metabolic Panel; Future    Pure hypercholesterolemia  -     Lipid, Fasting; Future    Gastroesophageal reflux disease without esophagitis  -     chlordiazePOXIDE-clidinium (LIBRAX) 5-2.5 MG per capsule; Take 1 capsule by mouth daily. Prediabetes  -     Hemoglobin A1C; Future    Screening PSA (prostate specific antigen)  -     PSA Screening; Future          Labs ordered. Patient can get these today since he is fasting. Refill medications. Follow-up with specialist as scheduled. Return in 1 year (on 7/10/2021), or if symptoms worsen or fail to improve, for Medicare Annual Wellness Visit in 1 year.

## 2021-07-09 ASSESSMENT — PATIENT HEALTH QUESTIONNAIRE - PHQ9
2. FEELING DOWN, DEPRESSED OR HOPELESS: 0
SUM OF ALL RESPONSES TO PHQ QUESTIONS 1-9: 0
SUM OF ALL RESPONSES TO PHQ QUESTIONS 1-9: 0
SUM OF ALL RESPONSES TO PHQ9 QUESTIONS 1 & 2: 0
SUM OF ALL RESPONSES TO PHQ QUESTIONS 1-9: 0
1. LITTLE INTEREST OR PLEASURE IN DOING THINGS: 0

## 2021-07-09 ASSESSMENT — LIFESTYLE VARIABLES
AUDIT TOTAL SCORE: INCOMPLETE
HOW OFTEN DO YOU HAVE A DRINK CONTAINING ALCOHOL: NEVER
AUDIT-C TOTAL SCORE: INCOMPLETE
HOW OFTEN DO YOU HAVE A DRINK CONTAINING ALCOHOL: 0

## 2021-07-16 ENCOUNTER — OFFICE VISIT (OUTPATIENT)
Dept: PRIMARY CARE CLINIC | Age: 71
End: 2021-07-16
Payer: MEDICARE

## 2021-07-16 VITALS
WEIGHT: 188 LBS | DIASTOLIC BLOOD PRESSURE: 76 MMHG | HEIGHT: 72 IN | OXYGEN SATURATION: 98 % | BODY MASS INDEX: 25.47 KG/M2 | SYSTOLIC BLOOD PRESSURE: 108 MMHG | HEART RATE: 60 BPM

## 2021-07-16 DIAGNOSIS — Z12.5 SCREENING PSA (PROSTATE SPECIFIC ANTIGEN): ICD-10-CM

## 2021-07-16 DIAGNOSIS — R73.03 PREDIABETES: ICD-10-CM

## 2021-07-16 DIAGNOSIS — E78.00 PURE HYPERCHOLESTEROLEMIA: ICD-10-CM

## 2021-07-16 DIAGNOSIS — I10 ESSENTIAL HYPERTENSION: Primary | ICD-10-CM

## 2021-07-16 DIAGNOSIS — R20.2 PARESTHESIA OF BOTH FEET: ICD-10-CM

## 2021-07-16 DIAGNOSIS — K21.9 GASTROESOPHAGEAL REFLUX DISEASE WITHOUT ESOPHAGITIS: ICD-10-CM

## 2021-07-16 DIAGNOSIS — Z00.00 ROUTINE GENERAL MEDICAL EXAMINATION AT A HEALTH CARE FACILITY: ICD-10-CM

## 2021-07-16 DIAGNOSIS — I10 ESSENTIAL HYPERTENSION: ICD-10-CM

## 2021-07-16 LAB
ALBUMIN SERPL-MCNC: 4.9 G/DL (ref 3.5–5.2)
ALP BLD-CCNC: 65 U/L (ref 40–130)
ALT SERPL-CCNC: 27 U/L (ref 5–41)
ANION GAP SERPL CALCULATED.3IONS-SCNC: 14 MMOL/L (ref 7–19)
AST SERPL-CCNC: 30 U/L (ref 5–40)
BILIRUB SERPL-MCNC: 1.1 MG/DL (ref 0.2–1.2)
BUN BLDV-MCNC: 17 MG/DL (ref 8–23)
CALCIUM SERPL-MCNC: 10 MG/DL (ref 8.8–10.2)
CHLORIDE BLD-SCNC: 104 MMOL/L (ref 98–111)
CHOLESTEROL, FASTING: 129 MG/DL (ref 160–199)
CO2: 25 MMOL/L (ref 22–29)
CREAT SERPL-MCNC: 0.9 MG/DL (ref 0.5–1.2)
CREATININE URINE: 245.4 MG/DL (ref 4.2–622)
FOLATE: 8.6 NG/ML (ref 4.5–32.2)
GFR AFRICAN AMERICAN: >59
GFR NON-AFRICAN AMERICAN: >60
GLUCOSE BLD-MCNC: 102 MG/DL (ref 74–109)
HBA1C MFR BLD: 5.8 % (ref 4–6)
HDLC SERPL-MCNC: 43 MG/DL (ref 55–121)
LDL CHOLESTEROL CALCULATED: 71 MG/DL
MICROALBUMIN UR-MCNC: <1.2 MG/DL (ref 0–19)
MICROALBUMIN/CREAT UR-RTO: NORMAL MG/G
POTASSIUM SERPL-SCNC: 4.4 MMOL/L (ref 3.5–5)
PROSTATE SPECIFIC ANTIGEN: 1.58 NG/ML (ref 0–4)
SODIUM BLD-SCNC: 143 MMOL/L (ref 136–145)
TOTAL PROTEIN: 7.6 G/DL (ref 6.6–8.7)
TRIGLYCERIDE, FASTING: 74 MG/DL (ref 0–149)
TSH REFLEX FT4: 2.04 UIU/ML (ref 0.35–5.5)
VITAMIN B-12: 405 PG/ML (ref 211–946)

## 2021-07-16 PROCEDURE — G0439 PPPS, SUBSEQ VISIT: HCPCS | Performed by: STUDENT IN AN ORGANIZED HEALTH CARE EDUCATION/TRAINING PROGRAM

## 2021-07-16 PROCEDURE — 99214 OFFICE O/P EST MOD 30 MIN: CPT | Performed by: STUDENT IN AN ORGANIZED HEALTH CARE EDUCATION/TRAINING PROGRAM

## 2021-07-16 RX ORDER — EZETIMIBE AND SIMVASTATIN 10; 40 MG/1; MG/1
TABLET ORAL
Qty: 90 TABLET | Refills: 3 | Status: SHIPPED | OUTPATIENT
Start: 2021-07-16 | End: 2022-07-29 | Stop reason: SDUPTHER

## 2021-07-16 RX ORDER — LISINOPRIL 5 MG/1
TABLET ORAL
Qty: 90 TABLET | Refills: 3 | Status: SHIPPED | OUTPATIENT
Start: 2021-07-16 | End: 2022-07-29 | Stop reason: SDUPTHER

## 2021-07-16 RX ORDER — CHLORDIAZEPOXIDE HYDROCHLORIDE AND CLIDINIUM BROMIDE 5; 2.5 MG/1; MG/1
1 CAPSULE ORAL DAILY
Qty: 90 CAPSULE | Refills: 3 | Status: SHIPPED | OUTPATIENT
Start: 2021-07-16

## 2021-07-16 ASSESSMENT — PATIENT HEALTH QUESTIONNAIRE - PHQ9
SUM OF ALL RESPONSES TO PHQ QUESTIONS 1-9: 0
1. LITTLE INTEREST OR PLEASURE IN DOING THINGS: 0
SUM OF ALL RESPONSES TO PHQ QUESTIONS 1-9: 0
SUM OF ALL RESPONSES TO PHQ9 QUESTIONS 1 & 2: 0
SUM OF ALL RESPONSES TO PHQ QUESTIONS 1-9: 0
2. FEELING DOWN, DEPRESSED OR HOPELESS: 0

## 2021-07-16 NOTE — PROGRESS NOTES
Patient is here for separate 2 reasons: annual wellness visit as well as acute and chronic issues. Medicare Annual Wellness Visit  Name: Vivi Lesches Date: 2021   MRN: [id number] Sex: Male   Age: 70 y.o. Ethnicity: Non-/Non    : 1950 Race: Nya Carrasquillo is here for Annual Exam    Screenings for behavioral, psychosocial and functional/safety risks, and cognitive dysfunction are all negative except as indicated below. These results, as well as other patient data from the [address] form, are documented in Flowsheets linked to this Encounter. No Known Allergies      Prior to Visit Medications    Medication Sig Taking? Authorizing Provider   lisinopril (PRINIVIL;ZESTRIL) 5 MG tablet TAKE 1 TABLET BY MOUTH EVERY DAY Yes Oswald Culp MD   ezetimibe-simvastatin (VYTORIN) 10-40 MG per tablet TAKE 1 TABLET BY MOUTH Francie Fret Yes Oswald Culp MD   chlordiazePOXIDE-clidinium (LIBRAX) 5-2.5 MG per capsule Take 1 capsule by mouth daily. Yes Oswald Culp MD   dexlansoprazole (DEXILANT) 60 MG CPDR capsule Take 60 mg by mouth daily Yes Historical Provider, MD   aspirin 81 MG EC tablet Take 81 mg by mouth daily. Yes Historical Provider, MD   polycarbophil (FIBERCON) 625 MG tablet Take 625 mg by mouth daily.    Yes Historical Provider, MD         Past Medical History:   Diagnosis Date    Gallstones     GERD (gastroesophageal reflux disease)     Gout     HSN type I     HTN (hypertension)     Hyperlipidemia     Cholesterol management per pcp     Irritable bowel syndrome     Osteoarthritis        Past Surgical History:   Procedure Laterality Date    APPENDECTOMY      CHOLECYSTECTOMY      COLONOSCOPY      Dr. Nicki Munguia Right          Family History   Problem Relation Age of Onset    High Cholesterol Mother     Stroke Father     Other Father     Thyroid Disease Paternal Grandfather        CareTeam (Including outside providers/suppliers regularly involved in providing care):   Patient Care Team:  Sylvie Loyd MD as PCP - General (Family Medicine)  Sylvie Loyd MD as PCP - [facility] Empaneled Provider  Ronan Bradley MD (Cardiology)    Wt Readings from Last 3 Encounters:   07/16/21 188 lb (85.3 kg)   07/10/20 198 lb (89.8 kg)   05/20/19 195 lb 3.2 oz (88.5 kg)     Vitals:    07/16/21 0938   BP: 108/76   Pulse: 60   SpO2: 98%   Weight: 188 lb (85.3 kg)   Height: 6' (1.829 m)     Body mass index is 25.5 kg/m². Based upon direct observation of the patient, evaluation of cognition reveals recent and remote memory intact. Patient's complete Health Risk Assessment and screening values have been reviewed and are found in Flowsheets. The following problems were reviewed today and where indicated follow up appointments were made and/or referrals ordered. Positive Risk Factor Screenings with Interventions:            General Health and ACP:  General  In general, how would you say your health is?: Good  In the past 7 days, have you experienced any of the following?  New or Increased Pain, New or Increased Fatigue, Loneliness, Social Isolation, Stress or Anger?: None of These  Do you get the social and emotional support that you need?: Yes  Do you have a Living Will?: Yes  Advance Directives     Power of  Living Will ACP-Advance Directive ACP-Power of     Not on File Not on File Not on File Not on File      General Health Risk Interventions:  · no needs identified   Health Habits/Nutrition Interventions:  · no additional needs identified       Personalized Preventive Plan   Current Health Maintenance Status  Immunization History   Administered Date(s) Administered    Influenza, High Dose (Fluzone 65 yrs and older) 09/28/2018    Pneumococcal Conjugate 13-valent ([id number]) 05/17/2017    Pneumococcal Polysaccharide ([id number]) 05/17/2018    Tdap (Boostrix, Adacel) 05/17/2017    Zoster Live (Zostavax) SERVICES  [facility]  [address] [facility] [address] [address]  Dept: [phone]  Dept Fax: [phone]  Loc: [phone]      Subjective:     Chief Complaint   Patient presents with    Annual Exam       HPI:  Viviane Diallo is a 70 y.o. 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 Lives in [place]. Does well for himself and having good quality of life w/ wife and grandchildren    HTN  -Has been well-controlled on lisinopril 5mg  -Checks regularly at home in 110-120's/60-70's  -Denies HA, visual changes, cp, palpitations, or swelling    GERD  -Sees GI, Dr. Kt Barbosa in [place], [place]. Last CLN 2014, next due 2023   -Takes dexilant and librax for sx which have been controlled    HLD  -On ezetimibe-simvastatin 10-40. Lipid levels from 2020 reviewed, at goal.    Sees Dr. Chloé Rivera, cardiologist in [place], [place] who has done vascular procedures for his legs. Notes occasional tingling in feet which he will take apap for. No h/o diabetes, only prediabetes.     ROS:   Review of Systems  Reviewed with patient and noted to be negative except that listed in HPI    PMHx:  Past Medical History:   Diagnosis Date    Gallstones     GERD (gastroesophageal reflux disease)     Gout     HSN type I     HTN (hypertension)     Hyperlipidemia     Cholesterol management per pcp     Irritable bowel syndrome     Osteoarthritis      Patient Active Problem List   Diagnosis    GERD (gastroesophageal reflux disease)    HSN type I    Gout    Gallstones    Hyperlipidemia    HTN (hypertension)       PSHx:  Past Surgical History:   Procedure Laterality Date    APPENDECTOMY      CHOLECYSTECTOMY      COLONOSCOPY  2014    Dr. Andressa Restrepo Right        PFHx:  Family History   Problem Relation Age of Onset    High Cholesterol Mother     Stroke Father     Other Father     Thyroid Disease Paternal Grandfather        SocialHx:  Social History     Tobacco Use    Smoking status: Never Smoker    Smokeless tobacco: Never Used   Substance Use Topics    Alcohol use: No       Allergies:  No Known Allergies    Medications:  Current Outpatient Medications   Medication Sig Dispense Refill    lisinopril (PRINIVIL;ZESTRIL) 5 MG tablet TAKE 1 TABLET BY MOUTH EVERY DAY 90 tablet 3    ezetimibe-simvastatin (VYTORIN) 10-40 MG per tablet TAKE 1 TABLET BY MOUTH EVERY DAY 90 tablet 3    chlordiazePOXIDE-clidinium (LIBRAX) 5-2.5 MG per capsule Take 1 capsule by mouth daily. 90 capsule 3    dexlansoprazole (DEXILANT) 60 MG CPDR capsule Take 60 mg by mouth daily      aspirin 81 MG EC tablet Take 81 mg by mouth daily.  polycarbophil (FIBERCON) 625 MG tablet Take 625 mg by mouth daily. No current facility-administered medications for this visit. Objective:   PE:  /76   Pulse 60   Ht 6' (1.829 m)   Wt 188 lb (85.3 kg)   SpO2 98%   BMI 25.50 kg/m²   Physical Exam  Constitutional:       General: He is not in acute distress. Appearance: Normal appearance. HENT:      Head: Normocephalic. Nose: Nose normal.      Mouth/Throat:      Mouth: Mucous membranes are moist.      Pharynx: Oropharynx is clear. No oropharyngeal exudate or posterior oropharyngeal erythema. Eyes:      General: No scleral icterus. Extraocular Movements: Extraocular movements intact. Conjunctiva/sclera: Conjunctivae normal.   Cardiovascular:      Rate and Rhythm: Normal rate and regular rhythm. Pulses: Normal pulses. Heart sounds: No murmur heard. Pulmonary:      Effort: Pulmonary effort is normal.      Breath sounds: Normal breath sounds. Abdominal:      General: There is no distension. Palpations: Abdomen is soft. There is no mass. Tenderness: There is no abdominal tenderness.    Musculoskeletal:         General: Normal range of motion. Cervical back: Normal range of motion. Skin:     General: Skin is warm and dry. Capillary Refill: Capillary refill takes less than 2 seconds. Neurological:      General: No focal deficit present. Mental Status: He is alert and oriented to person, place, and time. Psychiatric:         Mood and Affect: Mood normal.         Behavior: Behavior normal.         Thought Content: Thought content normal.            Assessment & Plan   Morse Boxer was seen today for annual exam.    Diagnoses and all orders for this visit:    Essential hypertension  -At goal  -     lisinopril (PRINIVIL;ZESTRIL) 5 MG tablet; TAKE 1 TABLET BY MOUTH EVERY DAY  -     Comprehensive Metabolic Panel; Future  -     Microalbumin, Ur; Future    Pure hypercholesterolemia  -     ezetimibe-simvastatin (VYTORIN) 10-40 MG per tablet; TAKE 1 TABLET BY MOUTH EVERY DAY  -     Lipid, Fasting; Future    Gastroesophageal reflux disease without esophagitis  -Continue dexilant. Followed by GI  -     chlordiazePOXIDE-clidinium (LIBRAX) 5-2.5 MG per capsule; Take 1 capsule by mouth daily. Prediabetes  -     Hemoglobin A1C; Future    Paresthesia of both feet  -     Vitamin B12 & Folate; Future  -     TSH WITH REFLEX TO FT4; Future    Screening PSA (prostate specific antigen)  -     PSA Screening; Future    Routine general medical examination at a health care facility      Return for Medicare Annual Wellness Visit in 1 year. All questions were answered. Medications, including possible adverse effects, and instructions were reviewed and  understanding was confirmed. Follow-up recommendations, including when to contact or return to office (ie; if symptoms worsen or fail to improve), were discussed and acknowledged.     Electronically signed by Mary Lou Shelton MD on 7/16/21 at 9:47 AM CDT

## 2021-07-16 NOTE — PATIENT INSTRUCTIONS
Patient Education        Learning About Foods With Healthy Fats  What foods contain healthy fats? The foods you eat contain nutrients, such as vitamins and minerals. Fat is a nutrient. Your body needs the right amount to stay healthy and work as it should. You can use the list below to help you make choices about which foods to eat. Here are some foods that contain healthy fats. Unsaturated fats and oils  · Canola oil  · Corn oil  · Flaxseed oil  · Olive oil  · Peanut oil  · Safflower oil  · Sunflower oil  Seafood  · Herring  · Lake trout  · Mackerel  · Oysters  · Baltic  · Sardines  Nuts and seeds  · Almonds  · Norm seeds  · Flaxseeds (ground)  · Hazelnuts  · Nut butters (such as peanut and almond)  · Pumpkin seeds  · Sunflower seeds  · Walnuts  Fruits  · Avocados  · Olives  Work with your doctor to find out how much of this nutrient you need. Depending on your health, you may need more or less of it in your diet. Where can you learn more? Go to [email or web address] org and sign in to your [employer] account. Enter F360 in the [employer] box to learn more about \"Learning About Foods With Healthy Fats. \"     If you do not have an account, please click on the \"Sign Up Now\" link. Current as of: December 17, 2020               Content Version: 12.9  © 0734-6097 Healthwise, Incorporated. Care instructions adapted under license by [facility] ([facility]). If you have questions about a medical condition or this instruction, always ask your healthcare professional. [place] [address] any warranty or liability for your use of this information. Personalized Preventive Plan for Natasha Fry - 7/16/2021  Medicare offers a range of preventive health benefits. Some of the tests and screenings are paid in full while other may be subject to a deductible, co-insurance, and/or copay.     Some of these benefits include a comprehensive review of your medical history including

## 2022-07-22 SDOH — HEALTH STABILITY: PHYSICAL HEALTH: ON AVERAGE, HOW MANY DAYS PER WEEK DO YOU ENGAGE IN MODERATE TO STRENUOUS EXERCISE (LIKE A BRISK WALK)?: 4 DAYS

## 2022-07-22 SDOH — HEALTH STABILITY: PHYSICAL HEALTH: ON AVERAGE, HOW MANY MINUTES DO YOU ENGAGE IN EXERCISE AT THIS LEVEL?: 60 MIN

## 2022-07-22 ASSESSMENT — LIFESTYLE VARIABLES
HOW MANY STANDARD DRINKS CONTAINING ALCOHOL DO YOU HAVE ON A TYPICAL DAY: 0
HOW OFTEN DO YOU HAVE A DRINK CONTAINING ALCOHOL: NEVER
HOW MANY STANDARD DRINKS CONTAINING ALCOHOL DO YOU HAVE ON A TYPICAL DAY: PATIENT DOES NOT DRINK
HOW OFTEN DO YOU HAVE SIX OR MORE DRINKS ON ONE OCCASION: 1
HOW OFTEN DO YOU HAVE A DRINK CONTAINING ALCOHOL: 1

## 2022-07-22 ASSESSMENT — PATIENT HEALTH QUESTIONNAIRE - PHQ9
SUM OF ALL RESPONSES TO PHQ QUESTIONS 1-9: 0
SUM OF ALL RESPONSES TO PHQ QUESTIONS 1-9: 0
2. FEELING DOWN, DEPRESSED OR HOPELESS: 0
SUM OF ALL RESPONSES TO PHQ QUESTIONS 1-9: 0
SUM OF ALL RESPONSES TO PHQ QUESTIONS 1-9: 0
SUM OF ALL RESPONSES TO PHQ9 QUESTIONS 1 & 2: 0
1. LITTLE INTEREST OR PLEASURE IN DOING THINGS: 0

## 2022-07-29 ENCOUNTER — OFFICE VISIT (OUTPATIENT)
Dept: PRIMARY CARE CLINIC | Age: 72
End: 2022-07-29
Payer: MEDICARE

## 2022-07-29 VITALS
RESPIRATION RATE: 20 BRPM | BODY MASS INDEX: 25.6 KG/M2 | DIASTOLIC BLOOD PRESSURE: 78 MMHG | OXYGEN SATURATION: 96 % | WEIGHT: 189 LBS | SYSTOLIC BLOOD PRESSURE: 128 MMHG | TEMPERATURE: 98.7 F | HEART RATE: 79 BPM | HEIGHT: 72 IN

## 2022-07-29 DIAGNOSIS — Z00.00 MEDICARE ANNUAL WELLNESS VISIT, SUBSEQUENT: ICD-10-CM

## 2022-07-29 DIAGNOSIS — E78.00 PURE HYPERCHOLESTEROLEMIA: ICD-10-CM

## 2022-07-29 DIAGNOSIS — R73.03 PREDIABETES: ICD-10-CM

## 2022-07-29 DIAGNOSIS — I10 ESSENTIAL HYPERTENSION: ICD-10-CM

## 2022-07-29 DIAGNOSIS — R20.2 PARESTHESIA OF BOTH FEET: ICD-10-CM

## 2022-07-29 DIAGNOSIS — Z12.5 SCREENING PSA (PROSTATE SPECIFIC ANTIGEN): ICD-10-CM

## 2022-07-29 DIAGNOSIS — K21.9 GASTROESOPHAGEAL REFLUX DISEASE WITHOUT ESOPHAGITIS: ICD-10-CM

## 2022-07-29 DIAGNOSIS — I10 ESSENTIAL HYPERTENSION: Primary | ICD-10-CM

## 2022-07-29 LAB
ALBUMIN SERPL-MCNC: 4.7 G/DL (ref 3.5–5.2)
ALP BLD-CCNC: 72 U/L (ref 40–130)
ALT SERPL-CCNC: 42 U/L (ref 5–41)
ANION GAP SERPL CALCULATED.3IONS-SCNC: 10 MMOL/L (ref 7–19)
AST SERPL-CCNC: 32 U/L (ref 5–40)
BILIRUB SERPL-MCNC: 0.7 MG/DL (ref 0.2–1.2)
BUN BLDV-MCNC: 12 MG/DL (ref 8–23)
CALCIUM SERPL-MCNC: 9.6 MG/DL (ref 8.8–10.2)
CHLORIDE BLD-SCNC: 105 MMOL/L (ref 98–111)
CHOLESTEROL, FASTING: 130 MG/DL (ref 160–199)
CO2: 27 MMOL/L (ref 22–29)
CREAT SERPL-MCNC: 1 MG/DL (ref 0.5–1.2)
CREATININE URINE: 225.6 MG/DL (ref 4.2–622)
FOLATE: 10 NG/ML (ref 4.5–32.2)
GFR AFRICAN AMERICAN: >59
GFR NON-AFRICAN AMERICAN: >60
GLUCOSE BLD-MCNC: 125 MG/DL (ref 74–109)
HBA1C MFR BLD: 6.5 % (ref 4–6)
HDLC SERPL-MCNC: 37 MG/DL (ref 55–121)
LDL CHOLESTEROL CALCULATED: 65 MG/DL
MICROALBUMIN UR-MCNC: <1.2 MG/DL (ref 0–19)
MICROALBUMIN/CREAT UR-RTO: NORMAL MG/G
POTASSIUM SERPL-SCNC: 4.3 MMOL/L (ref 3.5–5)
PROSTATE SPECIFIC ANTIGEN: 1.5 NG/ML (ref 0–4)
SODIUM BLD-SCNC: 142 MMOL/L (ref 136–145)
TOTAL PROTEIN: 6.9 G/DL (ref 6.6–8.7)
TRIGLYCERIDE, FASTING: 140 MG/DL (ref 0–149)
TSH REFLEX FT4: 1.8 UIU/ML (ref 0.35–5.5)
VITAMIN B-12: 422 PG/ML (ref 211–946)

## 2022-07-29 PROCEDURE — G0439 PPPS, SUBSEQ VISIT: HCPCS | Performed by: STUDENT IN AN ORGANIZED HEALTH CARE EDUCATION/TRAINING PROGRAM

## 2022-07-29 PROCEDURE — 1123F ACP DISCUSS/DSCN MKR DOCD: CPT | Performed by: STUDENT IN AN ORGANIZED HEALTH CARE EDUCATION/TRAINING PROGRAM

## 2022-07-29 PROCEDURE — 99214 OFFICE O/P EST MOD 30 MIN: CPT | Performed by: STUDENT IN AN ORGANIZED HEALTH CARE EDUCATION/TRAINING PROGRAM

## 2022-07-29 RX ORDER — LISINOPRIL 5 MG/1
TABLET ORAL
Qty: 90 TABLET | Refills: 3 | Status: SHIPPED | OUTPATIENT
Start: 2022-07-29

## 2022-07-29 RX ORDER — EZETIMIBE AND SIMVASTATIN 10; 40 MG/1; MG/1
TABLET ORAL
Qty: 90 TABLET | Refills: 3 | Status: SHIPPED | OUTPATIENT
Start: 2022-07-29

## 2022-07-29 SDOH — ECONOMIC STABILITY: FOOD INSECURITY: WITHIN THE PAST 12 MONTHS, YOU WORRIED THAT YOUR FOOD WOULD RUN OUT BEFORE YOU GOT MONEY TO BUY MORE.: NEVER TRUE

## 2022-07-29 SDOH — ECONOMIC STABILITY: FOOD INSECURITY: WITHIN THE PAST 12 MONTHS, THE FOOD YOU BOUGHT JUST DIDN'T LAST AND YOU DIDN'T HAVE MONEY TO GET MORE.: NEVER TRUE

## 2022-07-29 ASSESSMENT — SOCIAL DETERMINANTS OF HEALTH (SDOH): HOW HARD IS IT FOR YOU TO PAY FOR THE VERY BASICS LIKE FOOD, HOUSING, MEDICAL CARE, AND HEATING?: NOT HARD AT ALL

## 2022-07-29 NOTE — PATIENT INSTRUCTIONS
Personalized Preventive Plan for [facility] - 7/29/2022  Medicare offers a range of preventive health benefits. Some of the tests and screenings are paid in full while other may be subject to a deductible, co-insurance, and/or copay. Some of these benefits include a comprehensive review of your medical history including lifestyle, illnesses that may run in your family, and various assessments and screenings as appropriate. After reviewing your medical record and screening and assessments performed today your provider may have ordered immunizations, labs, imaging, and/or referrals for you. A list of these orders (if applicable) as well as your Preventive Care list are included within your After Visit Summary for your review. Other Preventive Recommendations:    A preventive eye exam performed by an eye specialist is recommended every 1-2 years to screen for glaucoma; cataracts, macular degeneration, and other eye disorders. A preventive dental visit is recommended every 6 months. Try to get at least 150 minutes of exercise per week or 10,000 steps per day on a pedometer . Order or download the FREE \"Exercise & Physical Activity: Your Everyday Guide\" from [employer]. Call 3-974.200.7489 or search [employer] online. You need 5794-9279 mg of calcium and 9767-0627 IU of vitamin D per day. It is possible to meet your calcium requirement with diet alone, but a vitamin D supplement is usually necessary to meet this goal.  When exposed to the sun, use a sunscreen that protects against both UVA and UVB radiation with an SPF of 30 or greater. Reapply every 2 to 3 hours or after sweating, drying off with a towel, or swimming. Always wear a seat belt when traveling in a car. Always wear a helmet when riding a bicycle or motorcycle.

## 2022-07-29 NOTE — PROGRESS NOTES
Medicare Annual Wellness Visit    Jude Harvey is here for Medicare AWV    Assessment & Plan   Essential hypertension  -At goal. Continue current medication regimen  -     lisinopril (PRINIVIL;ZESTRIL) 5 MG tablet; TAKE 1 TABLET BY MOUTH EVERY DAY, Disp-90 tablet, R-3DX Code Needed  . Normal  -     ezetimibe-simvastatin (VYTORIN) 10-40 MG per tablet; TAKE 1 TABLET BY MOUTH EVERY DAY, Disp-90 tablet, R-3DX Code Needed  . Normal  -     Microalbumin, Ur; Future  -     Comprehensive Metabolic Panel; Future  Pure hypercholesterolemia  -     ezetimibe-simvastatin (VYTORIN) 10-40 MG per tablet; TAKE 1 TABLET BY MOUTH EVERY DAY, Disp-90 tablet, R-3DX Code Needed  . Normal  -     Lipid, Fasting; Future  Gastroesophageal reflux disease without esophagitis  Screening PSA (prostate specific antigen)  -     PSA Screening; Future  Prediabetes  -     Hemoglobin A1C; Future  Paresthesia of both feet  -     TSH with Reflex to FT4; Future  -     Vitamin B12 & Folate; Future    Recommendations for Preventive Services Due: see orders and patient instructions/AVS.  Recommended screening schedule for the next 5-10 years is provided to the patient in written form: see Patient Instructions/AVS.     No follow-ups on file. Subjective   The following acute and/or chronic problems were also addressed today:    HTN-Needing refill of lisinopil 5mg. BP well controlled with this  HLD-On ezetimibe/simvastatin 10/40. Lipids from 2021 at goal  GERD-Sx controlled on dexilant. Recent CLN/EGD done w/ outside GI. Pt reports both reports were normal.     Patient's complete Health Risk Assessment and screening values have been reviewed and are found in Flowsheets. The following problems were reviewed today and where indicated follow up appointments were made and/or referrals ordered.     Positive Risk Factor Screenings with Interventions:             General Health and ACP:  General  In general, how would you say your health is?: Very Good  In the past 7 days, have you experienced any of the following: New or Increased Pain, New or Increased Fatigue, Loneliness, Social Isolation, Stress or Anger?: No  Do you get the social and emotional support that you need?: Yes  Do you have a Living Will?: Yes    Advance Directives       Power of Quentin Joyner Will ACP-Advance Directive ACP-Power of     Not on File Not on File Not on File Not on File        General Health Risk Interventions:  No further interventions identified              Objective   Vitals:    07/29/22 0830   BP: 128/78   Pulse: 79   Resp: 20   Temp: 98.7 °F (37.1 °C)   TempSrc: Temporal   SpO2: 96%   Weight: 189 lb (85.7 kg)   Height: 6' (1.829 m)      Body mass index is 25.63 kg/m².       General Appearance: alert and oriented to person, place and time, well developed and well- nourished, in no acute distress  Skin: warm and dry, no rash or erythema  Head: normocephalic and atraumatic  Eyes: pupils equal, round, and reactive to light, extraocular eye movements intact, conjunctivae normal  ENT: tympanic membrane, external ear and ear canal normal bilaterally, nose without deformity, nasal mucosa and turbinates normal without polyps  Neck: supple and non-tender without mass, no thyromegaly or thyroid nodules, no cervical lymphadenopathy  Pulmonary/Chest: clear to auscultation bilaterally- no wheezes, rales or rhonchi, normal air movement, no respiratory distress  Cardiovascular: normal rate, regular rhythm, normal S1 and S2, no murmurs, rubs, clicks, or gallops, distal pulses intact, no carotid bruits  Abdomen: soft, non-tender, non-distended, normal bowel sounds, no masses or organomegaly  Extremities: no cyanosis, clubbing or edema  Musculoskeletal: normal range of motion, no joint swelling, deformity or tenderness  Neurologic: reflexes normal and symmetric, no cranial nerve deficit, gait, coordination and speech normal  Skin: warm and dry, no rash or erythema  Head: normocephalic and

## 2022-07-29 NOTE — PROGRESS NOTES
Went to [facility] last week to have colonoscopy which was perfect per patient and had a EGD and had no change.

## 2022-11-11 ENCOUNTER — OFFICE VISIT (OUTPATIENT)
Dept: PRIMARY CARE CLINIC | Age: 72
End: 2022-11-11
Payer: MEDICARE

## 2022-11-11 VITALS
SYSTOLIC BLOOD PRESSURE: 108 MMHG | TEMPERATURE: 97.7 F | HEART RATE: 64 BPM | DIASTOLIC BLOOD PRESSURE: 74 MMHG | WEIGHT: 161.4 LBS | HEIGHT: 72 IN | BODY MASS INDEX: 21.86 KG/M2 | OXYGEN SATURATION: 94 %

## 2022-11-11 DIAGNOSIS — Z76.89 ENCOUNTER TO ESTABLISH CARE WITH NEW DOCTOR: Primary | ICD-10-CM

## 2022-11-11 DIAGNOSIS — I10 PRIMARY HYPERTENSION: ICD-10-CM

## 2022-11-11 DIAGNOSIS — R73.03 PREDIABETES: ICD-10-CM

## 2022-11-11 DIAGNOSIS — E78.5 HYPERLIPIDEMIA, UNSPECIFIED HYPERLIPIDEMIA TYPE: ICD-10-CM

## 2022-11-11 DIAGNOSIS — K21.9 GASTROESOPHAGEAL REFLUX DISEASE WITHOUT ESOPHAGITIS: ICD-10-CM

## 2022-11-11 LAB — HBA1C MFR BLD: 6 %

## 2022-11-11 PROCEDURE — 99213 OFFICE O/P EST LOW 20 MIN: CPT | Performed by: FAMILY MEDICINE

## 2022-11-11 PROCEDURE — 3074F SYST BP LT 130 MM HG: CPT | Performed by: FAMILY MEDICINE

## 2022-11-11 PROCEDURE — 83036 HEMOGLOBIN GLYCOSYLATED A1C: CPT | Performed by: FAMILY MEDICINE

## 2022-11-11 PROCEDURE — 1123F ACP DISCUSS/DSCN MKR DOCD: CPT | Performed by: FAMILY MEDICINE

## 2022-11-11 PROCEDURE — 3078F DIAST BP <80 MM HG: CPT | Performed by: FAMILY MEDICINE

## 2022-11-11 ASSESSMENT — ENCOUNTER SYMPTOMS
NAUSEA: 0
BACK PAIN: 0
WHEEZING: 0
EYE PAIN: 0
CHEST TIGHTNESS: 0
ABDOMINAL PAIN: 0
SHORTNESS OF BREATH: 0
COUGH: 0
SORE THROAT: 0
DIARRHEA: 0
TROUBLE SWALLOWING: 0
VOMITING: 0
CONSTIPATION: 0

## 2022-11-11 NOTE — PROGRESS NOTES
[address] [facility] CARE  [address] [address] [place] [address]  Dept: [phone]  Dept Fax: [phone]  Loc: [phone]      Subjective:     Chief Complaint   Patient presents with    [facility]     Formerly with Dr. Devries Other        HPI:  Dede Gonzalez is a 67 y.o. male presents today  to get establish as new patient. He is a former Dr Robert Westfall patient. Last time he was here, his A1c was noted to be elevated. He states he has been working on his diet and he continues to stay active. He still works fulltime as an . Repeat A1c today is 6%    ROS:   Review of Systems   Constitutional:  Negative for appetite change, chills, fatigue and fever. HENT:  Negative for congestion, ear pain, hearing loss, nosebleeds, sore throat and trouble swallowing. Eyes:  Negative for pain and visual disturbance. Respiratory:  Negative for cough, chest tightness, shortness of breath and wheezing. Cardiovascular:  Negative for chest pain, palpitations and leg swelling. Gastrointestinal:  Negative for abdominal pain, constipation, diarrhea, nausea and vomiting. Endocrine: Negative for cold intolerance, heat intolerance, polydipsia, polyphagia and polyuria. Genitourinary:  Negative for difficulty urinating, dysuria, frequency, hematuria and urgency. Musculoskeletal:  Negative for arthralgias, back pain, gait problem, joint swelling, myalgias, neck pain and neck stiffness. Skin:  Negative for pallor and rash. Allergic/Immunologic: Negative for environmental allergies and food allergies. Neurological:  Negative for dizziness, weakness and numbness. Hematological:  Negative for adenopathy. Does not bruise/bleed easily. Psychiatric/Behavioral:  Negative for agitation, behavioral problems, decreased concentration and sleep disturbance. The patient is not nervous/anxious.       PMHx:  Past Medical History:   Diagnosis Date    Gallstones Gallstones     GERD (gastroesophageal reflux disease)     Gout     HSN type I     HTN (hypertension)     Hyperlipidemia     Cholesterol management per pcp     Irritable bowel syndrome     Osteoarthritis      Patient Active Problem List   Diagnosis    GERD (gastroesophageal reflux disease)    HSN type I    Gout    Hyperlipidemia    HTN (hypertension)       PSHx:  Past Surgical History:   Procedure Laterality Date    APPENDECTOMY      CHOLECYSTECTOMY      COLONOSCOPY  2014    Dr. Brianna Morgan Right        PFHx:  Family History   Problem Relation Age of Onset    High Cholesterol Mother     Stroke Father     Other Father     Thyroid Disease Paternal Grandfather        SocialHx:  Social History     Tobacco Use    Smoking status: Never    Smokeless tobacco: Never   Substance Use Topics    Alcohol use: No       Allergies:  No Known Allergies    Medications:  Current Outpatient Medications   Medication Sig Dispense Refill    lisinopril (PRINIVIL;ZESTRIL) 5 MG tablet TAKE 1 TABLET BY MOUTH EVERY DAY 90 tablet 3    ezetimibe-simvastatin (VYTORIN) 10-40 MG per tablet TAKE 1 TABLET BY MOUTH EVERY DAY 90 tablet 3    chlordiazePOXIDE-clidinium (LIBRAX) 5-2.5 MG per capsule Take 1 capsule by mouth daily. 90 capsule 3    dexlansoprazole (DEXILANT) 60 MG CPDR delayed release capsule Take 60 mg by mouth daily      aspirin 81 MG EC tablet Take 81 mg by mouth daily. polycarbophil (FIBERCON) 625 MG tablet Take 625 mg by mouth daily. No current facility-administered medications for this visit. Objective:   PE:  /74   Pulse 64   Temp 97.7 °F (36.5 °C)   Ht 6' (1.829 m)   Wt 161 lb 6.4 oz (73.2 kg)   SpO2 94%   BMI 21.89 kg/m²   Physical Exam  Constitutional:       General: He is not in acute distress. Appearance: Normal appearance. HENT:      Head: Normocephalic.       Right Ear: External ear normal.      Left Ear: External ear normal.      Nose: Nose normal. Mouth/Throat:      Mouth: Mucous membranes are moist.      Pharynx: Oropharynx is clear. No oropharyngeal exudate or posterior oropharyngeal erythema. Eyes:      General: No scleral icterus. Extraocular Movements: Extraocular movements intact. Conjunctiva/sclera: Conjunctivae normal.   Cardiovascular:      Rate and Rhythm: Normal rate and regular rhythm. Pulses: Normal pulses. Heart sounds: Normal heart sounds. No murmur heard. Pulmonary:      Effort: Pulmonary effort is normal.      Breath sounds: Normal breath sounds. Abdominal:      General: There is no distension. Palpations: Abdomen is soft. There is no mass. Tenderness: There is no abdominal tenderness. Musculoskeletal:         General: No swelling or tenderness. Normal range of motion. Cervical back: Neck supple. Skin:     General: Skin is warm and dry. Capillary Refill: Capillary refill takes less than 2 seconds. Findings: No lesion. Neurological:      General: No focal deficit present. Mental Status: He is alert and oriented to person, place, and time. Motor: No weakness. Psychiatric:         Mood and Affect: Mood normal.         Behavior: Behavior normal.         Thought Content: Thought content normal.          Assessment & Plan   Johnny Clay was seen today for establish care.     Diagnoses and all orders for this visit:    Encounter to establish care with new doctor    Prediabetes  -     POCT glycosylated hemoglobin (Hb A1C)    Gastroesophageal reflux disease without esophagitis    Primary hypertension    Hyperlipidemia, unspecified hyperlipidemia type    Continue present care and management  Continue all maintenance medications  Encouraged to continue healthy lifestyle change  Engage in regular exercise daily -30 minutes a day as tolerated  Stay well  hydrated - drink at least 64 oz of fluid a day  Eat 6 servings of fruit and vegetables daily  Keep scheduled follow-up appt with me   Call with new concerns     Return in about 6 months (around 5/11/2023) for Medicare Wellness Visit, med check. All questions were answered. Medications, including possible adverse effects, and instructions were reviewed and  understanding was confirmed. Follow-up recommendations, including when to contact or return to office (ie; if symptoms worsen or fail to improve), were discussed and acknowledged.     Electronically signed by Festus Corley MD on 11/11/22 at 8:24 AM CST

## 2023-04-26 ENCOUNTER — HOSPITAL ENCOUNTER (EMERGENCY)
Age: 73
Discharge: HOME | End: 2023-04-27
Attending: EMERGENCY MEDICINE
Payer: MEDICARE

## 2023-04-26 VITALS
TEMPERATURE: 97.6 F | DIASTOLIC BLOOD PRESSURE: 79 MMHG | BODY MASS INDEX: 26.28 KG/M2 | RESPIRATION RATE: 20 BRPM | OXYGEN SATURATION: 97 % | HEIGHT: 72 IN | HEART RATE: 59 BPM | WEIGHT: 194 LBS | SYSTOLIC BLOOD PRESSURE: 138 MMHG

## 2023-04-26 DIAGNOSIS — R51.9 NONINTRACTABLE HEADACHE, UNSPECIFIED CHRONICITY PATTERN, UNSPECIFIED HEADACHE TYPE: ICD-10-CM

## 2023-04-26 DIAGNOSIS — R04.0 EPISTAXIS: Primary | ICD-10-CM

## 2023-04-26 PROCEDURE — 99284 EMERGENCY DEPT VISIT MOD MDM: CPT

## 2023-04-26 ASSESSMENT — PAIN - FUNCTIONAL ASSESSMENT: PAIN_FUNCTIONAL_ASSESSMENT: NONE - DENIES PAIN

## 2023-04-27 ENCOUNTER — TELEPHONE (OUTPATIENT)
Dept: ENT CLINIC | Age: 73
End: 2023-04-27

## 2023-04-27 ENCOUNTER — APPOINTMENT (OUTPATIENT)
Dept: CT IMAGING | Age: 73
End: 2023-04-27
Payer: MEDICARE

## 2023-04-27 ENCOUNTER — TELEPHONE (OUTPATIENT)
Dept: PRIMARY CARE CLINIC | Age: 73
End: 2023-04-27

## 2023-04-27 DIAGNOSIS — R04.0 NASAL BLEEDING: Primary | ICD-10-CM

## 2023-04-27 LAB
ERYTHROCYTE [DISTWIDTH] IN BLOOD BY AUTOMATED COUNT: 13.1 % (ref 11.5–14.5)
HCT VFR BLD AUTO: 46.4 % (ref 42–52)
HGB BLD-MCNC: 15.1 G/DL (ref 14–18)
INR PPP: 0.99 (ref 0.88–1.18)
MCH RBC QN AUTO: 29.8 PG (ref 27–31)
MCHC RBC AUTO-ENTMCNC: 32.5 G/DL (ref 33–37)
MCV RBC AUTO: 91.7 FL (ref 80–94)
PLATELET # BLD AUTO: 230 K/UL (ref 130–400)
PMV BLD AUTO: 9 FL (ref 9.4–12.4)
PROTHROMBIN TIME: 13 SEC (ref 12–14.6)
RBC # BLD AUTO: 5.06 M/UL (ref 4.7–6.1)
WBC # BLD AUTO: 7.2 K/UL (ref 4.8–10.8)

## 2023-04-27 PROCEDURE — 85610 PROTHROMBIN TIME: CPT

## 2023-04-27 PROCEDURE — 36415 COLL VENOUS BLD VENIPUNCTURE: CPT

## 2023-04-27 PROCEDURE — 85027 COMPLETE CBC AUTOMATED: CPT

## 2023-04-27 PROCEDURE — 70450 CT HEAD/BRAIN W/O DYE: CPT

## 2023-04-27 PROCEDURE — 70450 CT HEAD/BRAIN W/O DYE: CPT | Performed by: RADIOLOGY

## 2023-04-27 ASSESSMENT — ENCOUNTER SYMPTOMS
DIARRHEA: 0
EYE PAIN: 0
VOMITING: 0
SHORTNESS OF BREATH: 0
FACIAL SWELLING: 0
SORE THROAT: 0
ABDOMINAL PAIN: 0
SINUS PAIN: 0

## 2023-04-27 NOTE — TELEPHONE ENCOUNTER
Pt called, was in ER last night, Dr. Abel  is sending a referral, I explained that we have to receive that, and we will call once they have the information. He said he has had constant nosebleeds and would like to be seen as soon as possible. Thanks!

## 2023-04-27 NOTE — TELEPHONE ENCOUNTER
----- Message from Dave Bales sent at 4/27/2023  8:05 AM CDT -----  Subject: Referral Request    Reason for referral request? ENT- nose bleeds  Provider patient wants to be referred to(if known):     Provider Phone Number(if known):     Additional Information for Provider? has been to the ER a few time for   nose bleeds 5-6 times a day for about 30 mins (right nostril)   ---------------------------------------------------------------------------  --------------  Gisselle Damon [facility]    [phone]; OK to leave message on voicemail  ---------------------------------------------------------------------------  --------------

## 2023-04-27 NOTE — ED PROVIDER NOTES
[facility]  eMERGENCY dEPARTMENT eNCOUnter      Pt Name: Kailee Barroso  MRN: [id number]  [place] 1950  Date of evaluation: 4/26/2023  Provider: Denia Christensen MD    CHIEF COMPLAINT       Chief Complaint   Patient presents with    Epistaxis     Since Sunday. Pt was seen in [facility] and sent home with A[facility]in spray[facility] No bleeding at this time. HISTORY OF PRESENT ILLNESS   (Location/Symptom, Timing/Onset,Context/Setting, Quality, Duration, Modifying Factors, Severity)  Note limiting factors. Kailee Barroso is a 67 y.o. male who presents to the emergency department due to intermittent epistaxis from the right nare since Sunday. Was seen at outside facility when symptoms started and put on Afrin. Has had some intermittent bleeding from the right nare since then that has been much milder. Has not used Afrin today. Has had some mild discomfort in the back of his head on the right. No injury. This is described as very mild. No vision changes numbness or weakness. No ear pain. No fevers. No neck pain or stiffness. No chest pain or shortness of breath. When he was seen at outside facility was told if he had any further bleeding he would need to see ENT and to come to a hospital with ENT coverage which is why he came here tonight. No history of any bleeding issues in the past.  No other bleeding problems. On a baby aspirin but has stopped this since the nosebleed started. HPI    NursingNotes were reviewed. REVIEW OF SYSTEMS    (2-9 systems for level 4, 10 or more for level 5)     Review of Systems   Constitutional:  Negative for fever. HENT:  Positive for nosebleeds. Negative for ear pain, facial swelling, sinus pain and sore throat. Eyes:  Negative for pain and visual disturbance. Respiratory:  Negative for shortness of breath. Cardiovascular:  Negative for chest pain and palpitations.    Gastrointestinal:  Negative for abdominal pain,

## 2023-04-28 ENCOUNTER — OFFICE VISIT (OUTPATIENT)
Dept: ENT CLINIC | Age: 73
End: 2023-04-28

## 2023-04-28 ENCOUNTER — TELEPHONE (OUTPATIENT)
Dept: ENT CLINIC | Age: 73
End: 2023-04-28

## 2023-04-28 VITALS — SYSTOLIC BLOOD PRESSURE: 132 MMHG | DIASTOLIC BLOOD PRESSURE: 76 MMHG | BODY MASS INDEX: 26.31 KG/M2 | WEIGHT: 194 LBS

## 2023-04-28 DIAGNOSIS — J34.0 NASAL SEPTUM ULCERATION: Primary | ICD-10-CM

## 2023-04-28 DIAGNOSIS — G89.18 POSTOPERATIVE PAIN: ICD-10-CM

## 2023-04-28 RX ORDER — TRAMADOL HYDROCHLORIDE 50 MG/1
50 TABLET ORAL EVERY 4 HOURS PRN
Qty: 18 TABLET | Refills: 0 | Status: SHIPPED | OUTPATIENT
Start: 2023-04-28 | End: 2023-05-01

## 2023-04-28 RX ORDER — OXYMETAZOLINE HYDROCHLORIDE 0.05 G/100ML
SPRAY NASAL
Qty: 30 ML | Refills: 0 | Status: SHIPPED | OUTPATIENT
Start: 2023-04-28

## 2023-04-28 RX ORDER — SODIUM CHLORIDE/ALOE VERA
GEL (GRAM) NASAL
Qty: 14 G | Refills: 3 | Status: SHIPPED | OUTPATIENT
Start: 2023-04-28

## 2023-04-28 ASSESSMENT — ENCOUNTER SYMPTOMS
RHINORRHEA: 0
EYE PAIN: 0
SINUS PAIN: 0
VOICE CHANGE: 0
SINUS PRESSURE: 0
TROUBLE SWALLOWING: 0
FACIAL SWELLING: 0
PHOTOPHOBIA: 0
SORE THROAT: 0

## 2023-04-28 NOTE — ASSESSMENT & PLAN NOTE
Epistaxis secondary to right posterior nasal septal ulceration- successfully cauterized with local anesthesia  Plan: Wound care instructions were discussed with the patient. He is to follow-up in the clinic in 2 weeks for reevaluation. He was reminded to call if he has any questions or problems.

## 2023-04-28 NOTE — PROGRESS NOTES
[facility] OTOLARYNGOLOGY/ENT  Mr. Cesar Richards is a pleasant 70-year-old  male that was referred by Dr. Felicia Medley due to problems with epistaxis. Patient reported this was first noted about 3 weeks ago and now has become more frequent. He reports his last bleed was this morning prior to coming in. He reports this is occurring from his right nostril. He denies any history of trauma or any prior nasal surgeries. He reports he only takes an aspirin a day and has been holding his over the last 3 days. Allergies: Patient has no known allergies. Current Outpatient Medications   Medication Sig Dispense Refill    saline nasal gel (AYR) GEL Apply gently with a q-tip to both nostrils bid x 14 days 14 g 3    oxymetazoline (AFRIN) 0.05 % nasal spray 4 squirts to the bleeding nostril as needed for bleeding 30 mL 0    traMADol (ULTRAM) 50 MG tablet Take 1 tablet by mouth every 4 hours as needed for Pain for up to 3 days. Intended supply: 3 days. Take lowest dose possible to manage pain Max Daily Amount: 300 mg 18 tablet 0    NONFORMULARY Domperidone 20mg tid.      lisinopril (PRINIVIL;ZESTRIL) 5 MG tablet TAKE 1 TABLET BY MOUTH EVERY DAY 90 tablet 3    ezetimibe-simvastatin (VYTORIN) 10-40 MG per tablet TAKE 1 TABLET BY MOUTH EVERY DAY 90 tablet 3    chlordiazePOXIDE-clidinium (LIBRAX) 5-2.5 MG per capsule Take 1 capsule by mouth daily. 90 capsule 3    dexlansoprazole (DEXILANT) 60 MG CPDR delayed release capsule Take 1 capsule by mouth daily      aspirin 81 MG EC tablet Take 1 tablet by mouth daily      polycarbophil (FIBERCON) 625 MG tablet Take 1 tablet by mouth daily       No current facility-administered medications for this visit.        Past Surgical History:   Procedure Laterality Date    APPENDECTOMY      CHOLECYSTECTOMY      COLONOSCOPY  2014    Dr. Hoffman Held Right        Past Medical History:   Diagnosis Date    Gallstones     Gallstones     GERD

## 2023-04-28 NOTE — TELEPHONE ENCOUNTER
VM Pt is have pain post cauterization and would like medication to [address]    Per TT he will send him something in . I called pt back and advised him.

## 2023-05-08 ENCOUNTER — OFFICE VISIT (OUTPATIENT)
Dept: PRIMARY CARE CLINIC | Age: 73
End: 2023-05-08
Payer: MEDICARE

## 2023-05-08 VITALS
HEIGHT: 72 IN | TEMPERATURE: 97.5 F | SYSTOLIC BLOOD PRESSURE: 136 MMHG | WEIGHT: 195.4 LBS | HEART RATE: 64 BPM | BODY MASS INDEX: 26.47 KG/M2 | OXYGEN SATURATION: 97 % | DIASTOLIC BLOOD PRESSURE: 84 MMHG

## 2023-05-08 DIAGNOSIS — E78.00 PURE HYPERCHOLESTEROLEMIA: ICD-10-CM

## 2023-05-08 DIAGNOSIS — Z01.89 ROUTINE LAB DRAW: ICD-10-CM

## 2023-05-08 DIAGNOSIS — K21.9 GASTROESOPHAGEAL REFLUX DISEASE WITHOUT ESOPHAGITIS: ICD-10-CM

## 2023-05-08 DIAGNOSIS — Z00.00 MEDICARE ANNUAL WELLNESS VISIT, SUBSEQUENT: Primary | ICD-10-CM

## 2023-05-08 DIAGNOSIS — I10 ESSENTIAL HYPERTENSION: ICD-10-CM

## 2023-05-08 DIAGNOSIS — Z12.5 SCREENING FOR PROSTATE CANCER: ICD-10-CM

## 2023-05-08 LAB
25(OH)D3 SERPL-MCNC: 29.3 NG/ML
ALBUMIN SERPL-MCNC: 5 G/DL (ref 3.5–5.2)
ALP SERPL-CCNC: 74 U/L (ref 40–130)
ALT SERPL-CCNC: 32 U/L (ref 5–41)
ANION GAP SERPL CALCULATED.3IONS-SCNC: 13 MMOL/L (ref 7–19)
AST SERPL-CCNC: 21 U/L (ref 5–40)
BASOPHILS # BLD: 0 K/UL (ref 0–0.2)
BASOPHILS NFR BLD: 0.6 % (ref 0–1)
BILIRUB SERPL-MCNC: 0.7 MG/DL (ref 0.2–1.2)
BUN SERPL-MCNC: 17 MG/DL (ref 8–23)
CALCIUM SERPL-MCNC: 10.1 MG/DL (ref 8.8–10.2)
CHLORIDE SERPL-SCNC: 104 MMOL/L (ref 98–111)
CHOLEST SERPL-MCNC: 137 MG/DL (ref 160–199)
CO2 SERPL-SCNC: 27 MMOL/L (ref 22–29)
CREAT SERPL-MCNC: 0.9 MG/DL (ref 0.5–1.2)
EOSINOPHIL # BLD: 0.1 K/UL (ref 0–0.6)
EOSINOPHIL NFR BLD: 0.7 % (ref 0–5)
ERYTHROCYTE [DISTWIDTH] IN BLOOD BY AUTOMATED COUNT: 12.8 % (ref 11.5–14.5)
GLUCOSE SERPL-MCNC: 109 MG/DL (ref 74–109)
HCT VFR BLD AUTO: 48.4 % (ref 42–52)
HDLC SERPL-MCNC: 45 MG/DL (ref 55–121)
HGB BLD-MCNC: 15.7 G/DL (ref 14–18)
IMM GRANULOCYTES # BLD: 0 K/UL
LDLC SERPL CALC-MCNC: 74 MG/DL
LYMPHOCYTES # BLD: 1.7 K/UL (ref 1.1–4.5)
LYMPHOCYTES NFR BLD: 23.8 % (ref 20–40)
MCH RBC QN AUTO: 29.6 PG (ref 27–31)
MCHC RBC AUTO-ENTMCNC: 32.4 G/DL (ref 33–37)
MCV RBC AUTO: 91.1 FL (ref 80–94)
MONOCYTES # BLD: 0.5 K/UL (ref 0–0.9)
MONOCYTES NFR BLD: 6.7 % (ref 0–10)
NEUTROPHILS # BLD: 4.9 K/UL (ref 1.5–7.5)
NEUTS SEG NFR BLD: 67.8 % (ref 50–65)
PLATELET # BLD AUTO: 312 K/UL (ref 130–400)
PMV BLD AUTO: 9.6 FL (ref 9.4–12.4)
POTASSIUM SERPL-SCNC: 4.4 MMOL/L (ref 3.5–5)
PROT SERPL-MCNC: 7.6 G/DL (ref 6.6–8.7)
PSA SERPL-MCNC: 2.07 NG/ML (ref 0–4)
RBC # BLD AUTO: 5.31 M/UL (ref 4.7–6.1)
SODIUM SERPL-SCNC: 144 MMOL/L (ref 136–145)
TRIGL SERPL-MCNC: 88 MG/DL (ref 0–149)
WBC # BLD AUTO: 7.2 K/UL (ref 4.8–10.8)

## 2023-05-08 PROCEDURE — 3078F DIAST BP <80 MM HG: CPT | Performed by: FAMILY MEDICINE

## 2023-05-08 PROCEDURE — 3074F SYST BP LT 130 MM HG: CPT | Performed by: FAMILY MEDICINE

## 2023-05-08 PROCEDURE — G0439 PPPS, SUBSEQ VISIT: HCPCS | Performed by: FAMILY MEDICINE

## 2023-05-08 PROCEDURE — 1123F ACP DISCUSS/DSCN MKR DOCD: CPT | Performed by: FAMILY MEDICINE

## 2023-05-08 RX ORDER — LISINOPRIL 5 MG/1
TABLET ORAL
Qty: 90 TABLET | Refills: 3 | Status: SHIPPED | OUTPATIENT
Start: 2023-05-08

## 2023-05-08 RX ORDER — EZETIMIBE AND SIMVASTATIN 10; 40 MG/1; MG/1
TABLET ORAL
Qty: 90 TABLET | Refills: 3 | Status: SHIPPED | OUTPATIENT
Start: 2023-05-08

## 2023-05-08 SDOH — ECONOMIC STABILITY: INCOME INSECURITY: HOW HARD IS IT FOR YOU TO PAY FOR THE VERY BASICS LIKE FOOD, HOUSING, MEDICAL CARE, AND HEATING?: NOT HARD AT ALL

## 2023-05-08 SDOH — ECONOMIC STABILITY: HOUSING INSECURITY
IN THE LAST 12 MONTHS, WAS THERE A TIME WHEN YOU DID NOT HAVE A STEADY PLACE TO SLEEP OR SLEPT IN A SHELTER (INCLUDING NOW)?: NO

## 2023-05-08 SDOH — ECONOMIC STABILITY: FOOD INSECURITY: WITHIN THE PAST 12 MONTHS, THE FOOD YOU BOUGHT JUST DIDN'T LAST AND YOU DIDN'T HAVE MONEY TO GET MORE.: NEVER TRUE

## 2023-05-08 SDOH — ECONOMIC STABILITY: FOOD INSECURITY: WITHIN THE PAST 12 MONTHS, YOU WORRIED THAT YOUR FOOD WOULD RUN OUT BEFORE YOU GOT MONEY TO BUY MORE.: NEVER TRUE

## 2023-05-08 ASSESSMENT — PATIENT HEALTH QUESTIONNAIRE - PHQ9
SUM OF ALL RESPONSES TO PHQ QUESTIONS 1-9: 0
1. LITTLE INTEREST OR PLEASURE IN DOING THINGS: 0
SUM OF ALL RESPONSES TO PHQ QUESTIONS 1-9: 0
2. FEELING DOWN, DEPRESSED OR HOPELESS: 0
SUM OF ALL RESPONSES TO PHQ9 QUESTIONS 1 & 2: 0
SUM OF ALL RESPONSES TO PHQ QUESTIONS 1-9: 0
SUM OF ALL RESPONSES TO PHQ QUESTIONS 1-9: 0

## 2023-05-08 ASSESSMENT — LIFESTYLE VARIABLES: HOW OFTEN DO YOU HAVE A DRINK CONTAINING ALCOHOL: NEVER

## 2023-05-08 NOTE — PATIENT INSTRUCTIONS
We are committed to providing you with the best care possible. In order to help us achieve these goals please remember to bring all medications, herbal products, and over the counter supplements with you to each visit. If your provider has ordered testing for you, please be sure to follow up with our office if you have not received results within 7 days after the testing took place. *If you receive a survey after visiting one of our offices, please take time to share your experience concerning your physician office visit. These surveys are confidential and no health information about you is shared. We are eager to improve for you and we are counting on your feedback to help make that happen. For more information on your local Area Agency on Aging or [place] on Aging please visit the appropriate web site below:    [place]: [employer]: https://aging. [place].gov/    [facility]: https://aging.[place].gov/    [place]: InsuranceSquad.es           Learning About Vision Tests  What are vision tests? The four most common vision tests are visual acuity tests, refraction, visual field tests, and color vision tests. Visual acuity (sharpness) tests  These tests are used: To see if you need glasses or contact lenses. To monitor an eye problem. To check an eye injury. Visual acuity tests are done as part of routine exams. You may also have this test when you get your 's license or apply for some types of jobs. Visual field tests  These tests are used: To check for vision loss in any area of your range of vision. To screen for certain eye diseases. To look for nerve damage after a stroke, head injury, or other problem that could reduce blood flow to the brain. Refraction and color tests  A refraction test is done to find the right prescription for glasses and contact lenses. A color vision test is done to check for color blindness.   Color

## 2023-05-08 NOTE — PROGRESS NOTES
[facility] [address] [facility]  [id number] [place] [address]  Dept: [phone]  Dept Fax: [phone]  Loc: [phone]      Subjective:     Chief Complaint   Patient presents with    Medicare AWV       HPI:  Jhonatan Cantor is a 68 y.o. male presenting for his AMV   PMHX  and problem list reviewed. No changes reported. Chronic meds reviewed. Pt requesting refill on cholesterol and BP mediation  BP noted to be normal    ROS:   Review of Systems   Constitutional:  Negative for appetite change, chills, fatigue and fever. HENT:  Negative for congestion, ear pain, hearing loss, nosebleeds, sore throat and trouble swallowing. Eyes:  Negative for pain and visual disturbance. Respiratory:  Negative for cough, chest tightness, shortness of breath and wheezing. Cardiovascular:  Negative for chest pain, palpitations and leg swelling. Gastrointestinal:  Negative for abdominal pain, constipation, diarrhea, nausea and vomiting. Endocrine: Negative for cold intolerance, heat intolerance, polydipsia, polyphagia and polyuria. Genitourinary:  Negative for difficulty urinating, dysuria, frequency, hematuria and urgency. Musculoskeletal:  Negative for arthralgias, back pain, gait problem, joint swelling, myalgias, neck pain and neck stiffness. Skin:  Negative for pallor and rash. Allergic/Immunologic: Negative for environmental allergies and food allergies. Neurological:  Negative for dizziness, weakness and numbness. Hematological:  Negative for adenopathy. Does not bruise/bleed easily. Psychiatric/Behavioral:  Negative for agitation, behavioral problems, decreased concentration and sleep disturbance. The patient is not nervous/anxious.       PMHx:  Past Medical History:   Diagnosis Date    Gallstones     Gallstones     GERD (gastroesophageal reflux disease)     Gout     HSN type I     HTN (hypertension)     Hyperlipidemia     Cholesterol

## 2023-05-09 ASSESSMENT — ENCOUNTER SYMPTOMS
VOMITING: 0
SORE THROAT: 0
EYE PAIN: 0
ABDOMINAL PAIN: 0
CONSTIPATION: 0
TROUBLE SWALLOWING: 0
NAUSEA: 0
BACK PAIN: 0
COUGH: 0
WHEEZING: 0
SHORTNESS OF BREATH: 0
CHEST TIGHTNESS: 0
DIARRHEA: 0

## 2023-05-11 RX ORDER — ERGOCALCIFEROL 1.25 MG/1
50000 CAPSULE ORAL WEEKLY
Qty: 12 CAPSULE | Refills: 1 | Status: SHIPPED | OUTPATIENT
Start: 2023-05-11

## 2023-05-12 ENCOUNTER — TELEPHONE (OUTPATIENT)
Dept: PRIMARY CARE CLINIC | Age: 73
End: 2023-05-12

## 2023-05-12 NOTE — TELEPHONE ENCOUNTER
----- Message from Joanne Chairez MD sent at 5/11/2023  2:49 PM CDT -----  Vit D level is low -   Cholesterol is at goal  Blood count is normal  PSA normal  Metabolic panel - normal      Vit D 50,000 units sent to pharmacy - take 1 po once a week

## 2023-05-15 ENCOUNTER — OFFICE VISIT (OUTPATIENT)
Dept: ENT CLINIC | Age: 73
End: 2023-05-15
Payer: MEDICARE

## 2023-05-15 VITALS
SYSTOLIC BLOOD PRESSURE: 134 MMHG | HEIGHT: 72 IN | DIASTOLIC BLOOD PRESSURE: 72 MMHG | WEIGHT: 198 LBS | BODY MASS INDEX: 26.82 KG/M2

## 2023-05-15 DIAGNOSIS — J34.0 NASAL SEPTUM ULCERATION: Primary | ICD-10-CM

## 2023-05-15 PROCEDURE — 3075F SYST BP GE 130 - 139MM HG: CPT | Performed by: PHYSICIAN ASSISTANT

## 2023-05-15 PROCEDURE — 1123F ACP DISCUSS/DSCN MKR DOCD: CPT | Performed by: PHYSICIAN ASSISTANT

## 2023-05-15 PROCEDURE — 99213 OFFICE O/P EST LOW 20 MIN: CPT | Performed by: PHYSICIAN ASSISTANT

## 2023-05-15 PROCEDURE — 3078F DIAST BP <80 MM HG: CPT | Performed by: PHYSICIAN ASSISTANT

## 2023-05-15 NOTE — PROGRESS NOTES
Mr. Balbina Zurita is a pleasant 66-year-old  male that presents for 2-week follow-up after cauterization of a right posterior nasal septal ulceration. Patient reports that he had quite a bit of pain and was given trazodone for his discomfort. He reports this only lasted for about 3 to 4 days. Since then he has had no further bleeding and feels like the ulceration is healing well. Physical examination revealed the scab still be present and appears to be granulating well at the base of the scab. There is no evidence of infection or bleeding. Examination left ear demonstrated normal exam.  Oral exam demonstrated no bleeding to the posterior pharynx. Neck exam demonstrated no lymphadenopathy or thyromegaly. Impression: Doing well status post cauterization of right posterior nasal septal ulceration    Plan: Patient was advised to continue the saline nasal gel for another 10 days. Due to this being almost healed, he is to follow-up with me as needed. He was reminded to call if he has any questions or problems.       Electronically signed by Cristela Marcus PA-C on 5/15/23 at 9:21 AM CDT

## 2023-08-04 RX ORDER — ERGOCALCIFEROL 1.25 MG/1
CAPSULE ORAL
Qty: 12 CAPSULE | Refills: 1 | OUTPATIENT
Start: 2023-08-04

## 2023-08-04 NOTE — TELEPHONE ENCOUNTER
Monie Fuentes called to request a refill on his medication.       Last office visit : 5/8/2023   Next office visit : 11/8/2023     Requested Prescriptions     Pending Prescriptions Disp Refills    vitamin D (ERGOCALCIFEROL) 1.25 MG (27971 UT) CAPS capsule [Pharmacy Med Name: VITAMIN D2 50,000IU (ERGO) CAP RX] 12 capsule 1     Sig: TAKE 1 CAPSULE BY MOUTH 1 TIME A WEEK            Mikell Nissen, LPN

## 2023-11-06 ENCOUNTER — OFFICE VISIT (OUTPATIENT)
Dept: PRIMARY CARE CLINIC | Age: 73
End: 2023-11-06
Payer: MEDICARE

## 2023-11-06 VITALS
BODY MASS INDEX: 26.82 KG/M2 | HEIGHT: 72 IN | SYSTOLIC BLOOD PRESSURE: 142 MMHG | DIASTOLIC BLOOD PRESSURE: 80 MMHG | OXYGEN SATURATION: 95 % | WEIGHT: 198 LBS | TEMPERATURE: 97.6 F | HEART RATE: 58 BPM

## 2023-11-06 DIAGNOSIS — I10 ESSENTIAL HYPERTENSION: Primary | ICD-10-CM

## 2023-11-06 DIAGNOSIS — Z76.0 MEDICATION REFILL: ICD-10-CM

## 2023-11-06 DIAGNOSIS — K21.9 GASTROESOPHAGEAL REFLUX DISEASE WITHOUT ESOPHAGITIS: ICD-10-CM

## 2023-11-06 DIAGNOSIS — Z23 NEED FOR PNEUMOCOCCAL VACCINATION: ICD-10-CM

## 2023-11-06 DIAGNOSIS — E78.00 PURE HYPERCHOLESTEROLEMIA: ICD-10-CM

## 2023-11-06 PROCEDURE — 1123F ACP DISCUSS/DSCN MKR DOCD: CPT | Performed by: FAMILY MEDICINE

## 2023-11-06 PROCEDURE — 3079F DIAST BP 80-89 MM HG: CPT | Performed by: FAMILY MEDICINE

## 2023-11-06 PROCEDURE — G0009 ADMIN PNEUMOCOCCAL VACCINE: HCPCS | Performed by: FAMILY MEDICINE

## 2023-11-06 PROCEDURE — 90677 PCV20 VACCINE IM: CPT | Performed by: FAMILY MEDICINE

## 2023-11-06 PROCEDURE — 99213 OFFICE O/P EST LOW 20 MIN: CPT | Performed by: FAMILY MEDICINE

## 2023-11-06 PROCEDURE — 3077F SYST BP >= 140 MM HG: CPT | Performed by: FAMILY MEDICINE

## 2023-11-06 RX ORDER — DEXLANSOPRAZOLE 60 MG/1
60 CAPSULE, DELAYED RELEASE ORAL DAILY
Qty: 90 CAPSULE | Refills: 3 | Status: SHIPPED | OUTPATIENT
Start: 2023-11-06

## 2023-11-06 RX ORDER — EZETIMIBE AND SIMVASTATIN 10; 40 MG/1; MG/1
TABLET ORAL
Qty: 90 TABLET | Refills: 3 | Status: SHIPPED | OUTPATIENT
Start: 2023-11-06

## 2023-11-06 RX ORDER — LISINOPRIL 10 MG/1
TABLET ORAL
Qty: 90 TABLET | Refills: 3 | Status: SHIPPED | OUTPATIENT
Start: 2023-11-06

## 2023-11-06 RX ORDER — CHLORDIAZEPOXIDE HYDROCHLORIDE AND CLIDINIUM BROMIDE 5; 2.5 MG/1; MG/1
1 CAPSULE ORAL DAILY
Qty: 90 CAPSULE | Refills: 3 | Status: SHIPPED | OUTPATIENT
Start: 2023-11-06

## 2023-11-06 ASSESSMENT — ENCOUNTER SYMPTOMS
ABDOMINAL PAIN: 0
COUGH: 0
TROUBLE SWALLOWING: 0
VOMITING: 0
SORE THROAT: 0
DIARRHEA: 0
CHEST TIGHTNESS: 0
WHEEZING: 0
EYE PAIN: 0
CONSTIPATION: 0
BACK PAIN: 0
SHORTNESS OF BREATH: 0
NAUSEA: 0

## 2023-11-06 NOTE — PROGRESS NOTES
After obtaining consent, and per orders of Dr. Sandra Pandya, injection of Prevnar-20 given in Left deltoid by José Alcazar. Patient signed consent scanned into patients chart.
capsule; Take 1 capsule by mouth daily    Medication refill  -     lisinopril (PRINIVIL;ZESTRIL) 10 MG tablet; TAKE 1 TABLET BY MOUTH EVERY DAY  -     ezetimibe-simvastatin (VYTORIN) 10-40 MG per tablet; TAKE 1 TABLET BY MOUTH EVERY DAY  -     chlordiazePOXIDE-clidinium (LIBRAX) 5-2.5 MG per capsule; Take 1 capsule by mouth daily. -     dexlansoprazole (DEXILANT) 60 MG CPDR delayed release capsule; Take 1 capsule by mouth daily    Continue present care and management  Continue to monitor BP at home and keep a record  Continue all maintenance medications  Encouraged to continue healthy lifestyle change  Engage in regular exercise daily -30 minutes a day as tolerated  Stay well hydrated - drink at least 64 oz of fluid a day  Eat 6 servings of fruit and vegetables daily  Keep scheduled follow-up appt with me and other providers/specialists  Call with new concerns     Return in about 27 weeks (around 5/13/2024) for routine follow-up with me, med check, med refills, Medicare Wellness Visit. All questions were answered. Medications, including possible adverse effects, and instructions were reviewed and  understanding was confirmed. Follow-up recommendations, including when to contact or return to office (ie; if symptoms worsen or fail to improve), were discussed and acknowledged.     Electronically signed by Santiago Hair MD on 11/6/23 at 12:07 PM CST

## 2024-05-09 SDOH — HEALTH STABILITY: PHYSICAL HEALTH: ON AVERAGE, HOW MANY MINUTES DO YOU ENGAGE IN EXERCISE AT THIS LEVEL?: 150+ MIN

## 2024-05-09 ASSESSMENT — LIFESTYLE VARIABLES
HOW OFTEN DO YOU HAVE A DRINK CONTAINING ALCOHOL: 1
HOW MANY STANDARD DRINKS CONTAINING ALCOHOL DO YOU HAVE ON A TYPICAL DAY: 0
HOW OFTEN DO YOU HAVE A DRINK CONTAINING ALCOHOL: NEVER
HOW MANY STANDARD DRINKS CONTAINING ALCOHOL DO YOU HAVE ON A TYPICAL DAY: PATIENT DOES NOT DRINK
HOW OFTEN DO YOU HAVE SIX OR MORE DRINKS ON ONE OCCASION: 1

## 2024-05-09 ASSESSMENT — PATIENT HEALTH QUESTIONNAIRE - PHQ9
SUM OF ALL RESPONSES TO PHQ QUESTIONS 1-9: 0
1. LITTLE INTEREST OR PLEASURE IN DOING THINGS: NOT AT ALL
SUM OF ALL RESPONSES TO PHQ9 QUESTIONS 1 & 2: 0
2. FEELING DOWN, DEPRESSED OR HOPELESS: NOT AT ALL
SUM OF ALL RESPONSES TO PHQ QUESTIONS 1-9: 0

## 2024-05-10 SDOH — ECONOMIC STABILITY: TRANSPORTATION INSECURITY
IN THE PAST 12 MONTHS, HAS LACK OF TRANSPORTATION KEPT YOU FROM MEETINGS, WORK, OR FROM GETTING THINGS NEEDED FOR DAILY LIVING?: YES

## 2024-05-10 SDOH — ECONOMIC STABILITY: FOOD INSECURITY: WITHIN THE PAST 12 MONTHS, THE FOOD YOU BOUGHT JUST DIDN'T LAST AND YOU DIDN'T HAVE MONEY TO GET MORE.: NEVER TRUE

## 2024-05-10 SDOH — ECONOMIC STABILITY: INCOME INSECURITY: HOW HARD IS IT FOR YOU TO PAY FOR THE VERY BASICS LIKE FOOD, HOUSING, MEDICAL CARE, AND HEATING?: NOT HARD AT ALL

## 2024-05-10 SDOH — ECONOMIC STABILITY: FOOD INSECURITY: WITHIN THE PAST 12 MONTHS, YOU WORRIED THAT YOUR FOOD WOULD RUN OUT BEFORE YOU GOT MONEY TO BUY MORE.: NEVER TRUE

## 2024-05-13 ENCOUNTER — OFFICE VISIT (OUTPATIENT)
Dept: PRIMARY CARE CLINIC | Age: 74
End: 2024-05-13
Payer: MEDICARE

## 2024-05-13 VITALS
WEIGHT: 197.4 LBS | TEMPERATURE: 97.6 F | BODY MASS INDEX: 26.74 KG/M2 | HEIGHT: 72 IN | OXYGEN SATURATION: 96 % | DIASTOLIC BLOOD PRESSURE: 78 MMHG | SYSTOLIC BLOOD PRESSURE: 130 MMHG | HEART RATE: 66 BPM

## 2024-05-13 DIAGNOSIS — E55.9 VITAMIN D DEFICIENCY: ICD-10-CM

## 2024-05-13 DIAGNOSIS — E78.00 PURE HYPERCHOLESTEROLEMIA: ICD-10-CM

## 2024-05-13 DIAGNOSIS — K21.9 GASTROESOPHAGEAL REFLUX DISEASE WITHOUT ESOPHAGITIS: ICD-10-CM

## 2024-05-13 DIAGNOSIS — Z12.5 PROSTATE CANCER SCREENING: ICD-10-CM

## 2024-05-13 DIAGNOSIS — Z76.0 MEDICATION REFILL: ICD-10-CM

## 2024-05-13 DIAGNOSIS — Z87.898 HISTORY OF PREDIABETES: ICD-10-CM

## 2024-05-13 DIAGNOSIS — I10 ESSENTIAL HYPERTENSION: ICD-10-CM

## 2024-05-13 DIAGNOSIS — Z00.00 MEDICARE ANNUAL WELLNESS VISIT, SUBSEQUENT: Primary | ICD-10-CM

## 2024-05-13 LAB
25(OH)D3 SERPL-MCNC: 45.4 NG/ML
ALBUMIN SERPL-MCNC: 4.8 G/DL (ref 3.5–5.2)
ALP SERPL-CCNC: 60 U/L (ref 40–130)
ALT SERPL-CCNC: 30 U/L (ref 5–41)
ANION GAP SERPL CALCULATED.3IONS-SCNC: 11 MMOL/L (ref 7–19)
AST SERPL-CCNC: 26 U/L (ref 5–40)
BASOPHILS # BLD: 0.1 K/UL (ref 0–0.2)
BASOPHILS NFR BLD: 0.9 % (ref 0–1)
BILIRUB SERPL-MCNC: 0.8 MG/DL (ref 0.2–1.2)
BUN SERPL-MCNC: 13 MG/DL (ref 8–23)
CALCIUM SERPL-MCNC: 9.7 MG/DL (ref 8.8–10.2)
CHLORIDE SERPL-SCNC: 103 MMOL/L (ref 98–111)
CHOLEST SERPL-MCNC: 125 MG/DL (ref 160–199)
CO2 SERPL-SCNC: 27 MMOL/L (ref 22–29)
CREAT SERPL-MCNC: 1 MG/DL (ref 0.5–1.2)
EOSINOPHIL # BLD: 0.1 K/UL (ref 0–0.6)
EOSINOPHIL NFR BLD: 0.9 % (ref 0–5)
ERYTHROCYTE [DISTWIDTH] IN BLOOD BY AUTOMATED COUNT: 13.3 % (ref 11.5–14.5)
GLUCOSE SERPL-MCNC: 120 MG/DL (ref 74–109)
HBA1C MFR BLD: 6.5 % (ref 4–6)
HCT VFR BLD AUTO: 46.6 % (ref 42–52)
HDLC SERPL-MCNC: 41 MG/DL (ref 55–121)
HGB BLD-MCNC: 15.2 G/DL (ref 14–18)
IMM GRANULOCYTES # BLD: 0 K/UL
LDLC SERPL CALC-MCNC: 61 MG/DL
LYMPHOCYTES # BLD: 1.8 K/UL (ref 1.1–4.5)
LYMPHOCYTES NFR BLD: 32.3 % (ref 20–40)
MCH RBC QN AUTO: 29.6 PG (ref 27–31)
MCHC RBC AUTO-ENTMCNC: 32.6 G/DL (ref 33–37)
MCV RBC AUTO: 90.7 FL (ref 80–94)
MONOCYTES # BLD: 0.6 K/UL (ref 0–0.9)
MONOCYTES NFR BLD: 9.9 % (ref 0–10)
NEUTROPHILS # BLD: 3.1 K/UL (ref 1.5–7.5)
NEUTS SEG NFR BLD: 55.6 % (ref 50–65)
PLATELET # BLD AUTO: 267 K/UL (ref 130–400)
PMV BLD AUTO: 9.6 FL (ref 9.4–12.4)
POTASSIUM SERPL-SCNC: 4.3 MMOL/L (ref 3.5–5)
PROT SERPL-MCNC: 7.3 G/DL (ref 6.6–8.7)
PSA SERPL-MCNC: 1.37 NG/ML (ref 0–4)
RBC # BLD AUTO: 5.14 M/UL (ref 4.7–6.1)
SODIUM SERPL-SCNC: 141 MMOL/L (ref 136–145)
TRIGL SERPL-MCNC: 116 MG/DL (ref 0–149)
WBC # BLD AUTO: 5.5 K/UL (ref 4.8–10.8)

## 2024-05-13 PROCEDURE — 3078F DIAST BP <80 MM HG: CPT | Performed by: FAMILY MEDICINE

## 2024-05-13 PROCEDURE — G0439 PPPS, SUBSEQ VISIT: HCPCS | Performed by: FAMILY MEDICINE

## 2024-05-13 PROCEDURE — 1123F ACP DISCUSS/DSCN MKR DOCD: CPT | Performed by: FAMILY MEDICINE

## 2024-05-13 PROCEDURE — 3075F SYST BP GE 130 - 139MM HG: CPT | Performed by: FAMILY MEDICINE

## 2024-05-13 RX ORDER — DEXLANSOPRAZOLE 60 MG/1
60 CAPSULE, DELAYED RELEASE ORAL DAILY
Qty: 90 CAPSULE | Refills: 2 | Status: SHIPPED | OUTPATIENT
Start: 2024-05-13

## 2024-05-13 RX ORDER — EZETIMIBE AND SIMVASTATIN 10; 40 MG/1; MG/1
TABLET ORAL
Qty: 90 TABLET | Refills: 2 | Status: SHIPPED | OUTPATIENT
Start: 2024-05-13

## 2024-05-13 RX ORDER — LISINOPRIL 10 MG/1
TABLET ORAL
Qty: 90 TABLET | Refills: 2 | Status: SHIPPED | OUTPATIENT
Start: 2024-05-13

## 2024-05-13 RX ORDER — CHLORDIAZEPOXIDE HYDROCHLORIDE AND CLIDINIUM BROMIDE 5; 2.5 MG/1; MG/1
1 CAPSULE ORAL
Qty: 90 CAPSULE | Refills: 1 | Status: SHIPPED | OUTPATIENT
Start: 2024-05-13

## 2024-05-13 NOTE — PATIENT INSTRUCTIONS
We are committed to providing you with the best care possible.   In order to help us achieve these goals please remember to bring all medications, herbal products, and over the counter supplements with you to each visit.     If your provider has ordered testing for you, please be sure to follow up with our office if you have not received results within 7 days after the testing took place.     *If you receive a survey after visiting one of our offices, please take time to share your experience concerning your physician office visit. These surveys are confidential and no health information about you is shared.  We are eager to improve for you and we are counting on your feedback to help make that happen.         A Healthy Heart: Care Instructions  Overview     Coronary artery disease, also called heart disease, occurs when a substance called plaque builds up in the vessels that supply oxygen-rich blood to your heart muscle. This can narrow the blood vessels and reduce blood flow. A heart attack happens when blood flow is completely blocked. A high-fat diet, smoking, and other factors increase the risk of heart disease.  Your doctor has found that you have a chance of having heart disease. A heart-healthy lifestyle can help keep your heart healthy and prevent heart disease. This lifestyle includes eating healthy, being active, staying at a weight that's healthy for you, and not smoking or using tobacco. It also includes taking medicines as directed, managing other health conditions, and trying to get a healthy amount of sleep.  Follow-up care is a key part of your treatment and safety. Be sure to make and go to all appointments, and call your doctor if you are having problems. It's also a good idea to know your test results and keep a list of the medicines you take.  How can you care for yourself at home?  Diet    Use less salt when you cook and eat. This helps lower your blood pressure. Taste food before salting. Add

## 2024-05-13 NOTE — PROGRESS NOTES
Medicare Annual Wellness Visit    Yared Plasencia is here for Medicare AWV    Assessment & Plan   Medicare annual wellness visit, subsequent  Essential hypertension  -     lisinopril (PRINIVIL;ZESTRIL) 10 MG tablet; TAKE 1 TABLET BY MOUTH EVERY DAY, Disp-90 tablet, R-2DX Code Needed  .Normal  -     ezetimibe-simvastatin (VYTORIN) 10-40 MG per tablet; TAKE 1 TABLET BY MOUTH EVERY DAY, Disp-90 tablet, R-2DX Code Needed  .Normal  -     CBC with Auto Differential; Future  -     Comprehensive Metabolic Panel; Future  Pure hypercholesterolemia  -     ezetimibe-simvastatin (VYTORIN) 10-40 MG per tablet; TAKE 1 TABLET BY MOUTH EVERY DAY, Disp-90 tablet, R-2DX Code Needed  .Normal  -     Lipid, Fasting; Future  Gastroesophageal reflux disease without esophagitis  -     dexlansoprazole (DEXILANT) 60 MG CPDR delayed release capsule; Take 1 capsule by mouth daily, Disp-90 capsule, R-2Normal  Vitamin D deficiency  -     Vitamin D 25 Hydroxy; Future  Prostate cancer screening  -     PSA Screening; Future  History of prediabetes  -     Hemoglobin A1C; Future  Medication refill  -     lisinopril (PRINIVIL;ZESTRIL) 10 MG tablet; TAKE 1 TABLET BY MOUTH EVERY DAY, Disp-90 tablet, R-2DX Code Needed  .Normal  -     ezetimibe-simvastatin (VYTORIN) 10-40 MG per tablet; TAKE 1 TABLET BY MOUTH EVERY DAY, Disp-90 tablet, R-2DX Code Needed  .Normal  -     dexlansoprazole (DEXILANT) 60 MG CPDR delayed release capsule; Take 1 capsule by mouth daily, Disp-90 capsule, R-2Normal  -     chlordiazePOXIDE-clidinium (LIBRAX) 5-2.5 MG per capsule; Take 1 capsule by mouth 3 times daily (with meals). Max Daily Amount: 3 capsules, Disp-90 capsule, R-1Normal      Continue present care and management  Continue all maintenance medications  Encouraged to continue healthy lifestyle change  Engage in regular exercise daily -30 minutes a day as tolerated  Stay well  hydrated - drink at least 64 oz of fluid a day  Eat 6 servings of fruit and vegetables daily  Keep

## 2024-05-30 ENCOUNTER — TELEPHONE (OUTPATIENT)
Dept: PRIMARY CARE CLINIC | Age: 74
End: 2024-05-30

## 2024-05-30 NOTE — TELEPHONE ENCOUNTER
----- Message from Charisma Hagan MD sent at 5/29/2024  8:22 AM CDT -----  Blood work results acceptable

## 2024-12-09 ENCOUNTER — OFFICE VISIT (OUTPATIENT)
Dept: PRIMARY CARE CLINIC | Age: 74
End: 2024-12-09
Payer: COMMERCIAL

## 2024-12-09 VITALS
BODY MASS INDEX: 26.82 KG/M2 | SYSTOLIC BLOOD PRESSURE: 124 MMHG | HEIGHT: 72 IN | TEMPERATURE: 97.5 F | HEART RATE: 53 BPM | WEIGHT: 198 LBS | OXYGEN SATURATION: 96 % | DIASTOLIC BLOOD PRESSURE: 86 MMHG

## 2024-12-09 DIAGNOSIS — Z76.0 MEDICATION REFILL: ICD-10-CM

## 2024-12-09 DIAGNOSIS — I10 ESSENTIAL HYPERTENSION: Primary | ICD-10-CM

## 2024-12-09 DIAGNOSIS — K21.9 GASTROESOPHAGEAL REFLUX DISEASE WITHOUT ESOPHAGITIS: ICD-10-CM

## 2024-12-09 DIAGNOSIS — Z82.49 FAMILY HISTORY OF HEART DISEASE: ICD-10-CM

## 2024-12-09 DIAGNOSIS — E78.00 PURE HYPERCHOLESTEROLEMIA: ICD-10-CM

## 2024-12-09 PROCEDURE — 99214 OFFICE O/P EST MOD 30 MIN: CPT | Performed by: FAMILY MEDICINE

## 2024-12-09 PROCEDURE — 1123F ACP DISCUSS/DSCN MKR DOCD: CPT | Performed by: FAMILY MEDICINE

## 2024-12-09 PROCEDURE — 3079F DIAST BP 80-89 MM HG: CPT | Performed by: FAMILY MEDICINE

## 2024-12-09 PROCEDURE — 3074F SYST BP LT 130 MM HG: CPT | Performed by: FAMILY MEDICINE

## 2024-12-09 RX ORDER — LISINOPRIL 10 MG/1
TABLET ORAL
Qty: 90 TABLET | Refills: 1 | Status: SHIPPED | OUTPATIENT
Start: 2024-12-09

## 2024-12-09 RX ORDER — CHLORDIAZEPOXIDE HYDROCHLORIDE AND CLIDINIUM BROMIDE 5; 2.5 MG/1; MG/1
1 CAPSULE ORAL
Qty: 90 CAPSULE | Refills: 1 | Status: SHIPPED | OUTPATIENT
Start: 2024-12-09

## 2024-12-09 RX ORDER — EZETIMIBE AND SIMVASTATIN 10; 40 MG/1; MG/1
TABLET ORAL
Qty: 90 TABLET | Refills: 1 | Status: SHIPPED | OUTPATIENT
Start: 2024-12-09

## 2024-12-09 RX ORDER — DICYCLOMINE HYDROCHLORIDE 10 MG/1
10 CAPSULE ORAL 2 TIMES DAILY
COMMUNITY
Start: 2024-10-06

## 2024-12-09 RX ORDER — DEXLANSOPRAZOLE 60 MG/1
60 CAPSULE, DELAYED RELEASE ORAL DAILY
Qty: 90 CAPSULE | Refills: 1 | Status: SHIPPED | OUTPATIENT
Start: 2024-12-09

## 2024-12-09 ASSESSMENT — ENCOUNTER SYMPTOMS
VOMITING: 0
SHORTNESS OF BREATH: 0
CHEST TIGHTNESS: 0
ABDOMINAL PAIN: 0
WHEEZING: 0
EYE PAIN: 0
TROUBLE SWALLOWING: 0
CONSTIPATION: 0
DIARRHEA: 0
COUGH: 0
SORE THROAT: 0
BACK PAIN: 0
NAUSEA: 0

## 2024-12-09 NOTE — PROGRESS NOTES
ezetimibe-simvastatin (VYTORIN) 10-40 MG per tablet; TAKE 1 TABLET BY MOUTH EVERY DAY, Disp-90 tablet, R-1DX Code Needed  .Normal  -     Dany Krishna MD, Cardiology, [place]  4. Family history of heart disease  -     Dany Krishna MD, Cardiology, [place]  5. Medication refill  -     chlordiazePOXIDE-clidinium (LIBRAX) 5-2.5 MG per capsule; Take 1 capsule by mouth 3 times daily (with meals). Max Daily Amount: 3 capsules, Disp-90 capsule, R-1Normal  -     dexlansoprazole (DEXILANT) 60 MG CPDR delayed release capsule; Take 1 capsule by mouth daily, Disp-90 capsule, R-1Normal  -     ezetimibe-simvastatin (VYTORIN) 10-40 MG per tablet; TAKE 1 TABLET BY MOUTH EVERY DAY, Disp-90 tablet, R-1DX Code Needed  .Normal  -     lisinopril (PRINIVIL;ZESTRIL) 10 MG tablet; TAKE 1 TABLET BY MOUTH EVERY DAY, Disp-90 tablet, R-1DX Code Needed  .Normal      Continue present care and management  Continue all maintenance medications  Encouraged to continue healthy lifestyle change  Engage in regular exercise daily -30 minutes a day as tolerated  Stay well  hydrated - drink at least 64 oz of fluid a day  Eat 6 servings of fruit and vegetables daily  Keep scheduled follow-up appt with me and other providers/specialists  Call with new concerns     Return in about 6 months (around 6/9/2025) for routine follow-up with me, med check, med refills.    All questions were answered.  Medications, including possible adverse effects, and instructions were reviewed and  understanding was confirmed.   Follow-up recommendations, including when to contact or return to office (ie; if symptoms worsen or fail to improve), were discussed and acknowledged.    Electronically signed by Charisma Hagan MD on 12/9/24 at 11:39 AM CST

## 2025-03-03 ENCOUNTER — OFFICE VISIT (OUTPATIENT)
Dept: CARDIOLOGY CLINIC | Age: 75
End: 2025-03-03
Payer: MEDICARE

## 2025-03-03 VITALS
OXYGEN SATURATION: 97 % | HEART RATE: 70 BPM | SYSTOLIC BLOOD PRESSURE: 130 MMHG | BODY MASS INDEX: 26.28 KG/M2 | WEIGHT: 194 LBS | HEIGHT: 72 IN | DIASTOLIC BLOOD PRESSURE: 80 MMHG

## 2025-03-03 DIAGNOSIS — I10 PRIMARY HYPERTENSION: Primary | ICD-10-CM

## 2025-03-03 PROCEDURE — 1123F ACP DISCUSS/DSCN MKR DOCD: CPT | Performed by: INTERNAL MEDICINE

## 2025-03-03 PROCEDURE — 1159F MED LIST DOCD IN RCRD: CPT | Performed by: INTERNAL MEDICINE

## 2025-03-03 PROCEDURE — 99204 OFFICE O/P NEW MOD 45 MIN: CPT | Performed by: INTERNAL MEDICINE

## 2025-03-03 PROCEDURE — 93000 ELECTROCARDIOGRAM COMPLETE: CPT | Performed by: INTERNAL MEDICINE

## 2025-03-03 PROCEDURE — 3079F DIAST BP 80-89 MM HG: CPT | Performed by: INTERNAL MEDICINE

## 2025-03-03 PROCEDURE — 3075F SYST BP GE 130 - 139MM HG: CPT | Performed by: INTERNAL MEDICINE

## 2025-03-03 ASSESSMENT — ENCOUNTER SYMPTOMS
CHEST TIGHTNESS: 0
EYE PAIN: 0
BLOOD IN STOOL: 0
ABDOMINAL PAIN: 0
EYE DISCHARGE: 0
VOMITING: 0
EYE REDNESS: 0
SORE THROAT: 0
COUGH: 0
DIARRHEA: 0
SHORTNESS OF BREATH: 0
WHEEZING: 0
NAUSEA: 0
CONSTIPATION: 0
APNEA: 0
ABDOMINAL DISTENTION: 0
FACIAL SWELLING: 0

## 2025-03-03 NOTE — PROGRESS NOTES
Status   04/27/2023 13.0 12.0 - 14.6 sec Final     INR   Date Value Ref Range Status   04/27/2023 0.99 0.88 - 1.18 Final     Comment:     INR  < or = 1.3  Normal  INR = 2.0 - 3.0  Therapeutic  INR = 2.5 - 3.5  Therapeutic for patients with mechanical  prosthetic heart valve & MI prophylaxis  INR  > or = 3.5  Abnormal/Elevated  INR  > or = 5.0  Critical (requires immediate physician  notification)           LIVER PROFILE:   AST   Date Value Ref Range Status   05/13/2024 26 5 - 40 U/L Final     ALT   Date Value Ref Range Status   05/13/2024 30 5 - 41 U/L Final              Imaging:    - EKG : Sinus rhythm 66 bpm.  QTc 268 ms.  No acute change of injury or ischemia.        ASSESSMENT and PLAN:    Chronic essential hypertension  Goal blood pressure less than 130/80  Continue lisinopril  Home BP monitoring  Low-sodium diet, less than 2 g per 24 hours    Chronic dyslipidemia  Goal LDL less than 100  Continue Vytorin  LFT and lipid monitoring as per PCP                  Electronically signed by Dany Sotelo MD on 3/3/2025 at 11:52 AM    Dany Sotelo MD, MATT, [facility]  Noninvasive Cardiology Consultant    This dictation was generated by voice recognition computer software.  Although all attempts are made to edit the dictation for accuracy, there may be errors in the transcription that are not intended.

## 2025-05-12 ENCOUNTER — OFFICE VISIT (OUTPATIENT)
Dept: PRIMARY CARE CLINIC | Age: 75
End: 2025-05-12
Payer: MEDICARE

## 2025-05-12 VITALS
BODY MASS INDEX: 27.22 KG/M2 | HEART RATE: 61 BPM | SYSTOLIC BLOOD PRESSURE: 144 MMHG | WEIGHT: 201 LBS | TEMPERATURE: 97.3 F | OXYGEN SATURATION: 96 % | HEIGHT: 72 IN | DIASTOLIC BLOOD PRESSURE: 82 MMHG

## 2025-05-12 DIAGNOSIS — Z76.0 MEDICATION REFILL: ICD-10-CM

## 2025-05-12 DIAGNOSIS — E78.00 PURE HYPERCHOLESTEROLEMIA: ICD-10-CM

## 2025-05-12 DIAGNOSIS — Z87.898 HISTORY OF PREDIABETES: ICD-10-CM

## 2025-05-12 DIAGNOSIS — I10 ESSENTIAL HYPERTENSION: ICD-10-CM

## 2025-05-12 DIAGNOSIS — Z00.00 MEDICARE ANNUAL WELLNESS VISIT, SUBSEQUENT: Primary | ICD-10-CM

## 2025-05-12 LAB
ALBUMIN SERPL-MCNC: 4.7 G/DL (ref 3.5–5.2)
ALP SERPL-CCNC: 69 U/L (ref 40–129)
ALT SERPL-CCNC: 39 U/L (ref 10–50)
ANION GAP SERPL CALCULATED.3IONS-SCNC: 10 MMOL/L (ref 8–16)
AST SERPL-CCNC: 33 U/L (ref 10–50)
BILIRUB SERPL-MCNC: 0.9 MG/DL (ref 0.2–1.2)
BUN SERPL-MCNC: 15 MG/DL (ref 8–23)
CALCIUM SERPL-MCNC: 9.8 MG/DL (ref 8.8–10.2)
CHLORIDE SERPL-SCNC: 105 MMOL/L (ref 98–107)
CHOLEST SERPL-MCNC: 140 MG/DL (ref 0–199)
CO2 SERPL-SCNC: 27 MMOL/L (ref 22–29)
CREAT SERPL-MCNC: 1 MG/DL (ref 0.7–1.2)
GLUCOSE SERPL-MCNC: 117 MG/DL (ref 70–99)
HBA1C MFR BLD: 6.7 % (ref 4–5.6)
HDLC SERPL-MCNC: 38 MG/DL (ref 40–60)
LDLC SERPL CALC-MCNC: 76 MG/DL
POTASSIUM SERPL-SCNC: 4.4 MMOL/L (ref 3.5–5.1)
PROT SERPL-MCNC: 7 G/DL (ref 6.4–8.3)
SODIUM SERPL-SCNC: 142 MMOL/L (ref 136–145)
TRIGL SERPL-MCNC: 132 MG/DL (ref 0–149)

## 2025-05-12 PROCEDURE — 3077F SYST BP >= 140 MM HG: CPT | Performed by: FAMILY MEDICINE

## 2025-05-12 PROCEDURE — 99213 OFFICE O/P EST LOW 20 MIN: CPT | Performed by: FAMILY MEDICINE

## 2025-05-12 PROCEDURE — G0439 PPPS, SUBSEQ VISIT: HCPCS | Performed by: FAMILY MEDICINE

## 2025-05-12 PROCEDURE — 1123F ACP DISCUSS/DSCN MKR DOCD: CPT | Performed by: FAMILY MEDICINE

## 2025-05-12 PROCEDURE — 1159F MED LIST DOCD IN RCRD: CPT | Performed by: FAMILY MEDICINE

## 2025-05-12 PROCEDURE — 3079F DIAST BP 80-89 MM HG: CPT | Performed by: FAMILY MEDICINE

## 2025-05-12 RX ORDER — LISINOPRIL 10 MG/1
TABLET ORAL
Qty: 90 TABLET | Refills: 0 | Status: SHIPPED | OUTPATIENT
Start: 2025-05-12

## 2025-05-12 ASSESSMENT — PATIENT HEALTH QUESTIONNAIRE - PHQ9
SUM OF ALL RESPONSES TO PHQ QUESTIONS 1-9: 0
2. FEELING DOWN, DEPRESSED OR HOPELESS: NOT AT ALL
SUM OF ALL RESPONSES TO PHQ QUESTIONS 1-9: 0
1. LITTLE INTEREST OR PLEASURE IN DOING THINGS: NOT AT ALL

## 2025-05-12 ASSESSMENT — LIFESTYLE VARIABLES
HOW OFTEN DO YOU HAVE A DRINK CONTAINING ALCOHOL: NEVER
HOW MANY STANDARD DRINKS CONTAINING ALCOHOL DO YOU HAVE ON A TYPICAL DAY: PATIENT DOES NOT DRINK

## 2025-05-12 NOTE — PROGRESS NOTES
Medicare Annual Wellness Visit    Yared Plasencia is here for Medicare AWV (Pt presents for AWV. Pt presents no other issues )    Assessment & Plan   Medicare annual wellness visit, subsequent  Essential hypertension  -     lisinopril (PRINIVIL;ZESTRIL) 10 MG tablet; TAKE 1 TABLET BY MOUTH EVERY DAY, Disp-90 tablet, R-0DX Code Needed  .Normal  -     Comprehensive Metabolic Panel; Future  Pure hypercholesterolemia  -     Lipid, Fasting; Future  History of prediabetes  -     Hemoglobin A1C; Future  Medication refill  -     lisinopril (PRINIVIL;ZESTRIL) 10 MG tablet; TAKE 1 TABLET BY MOUTH EVERY DAY, Disp-90 tablet, R-0DX Code Needed  .Normal    Labs as ordered  Continue present care and management  Continue all maintenance medications  Encouraged  healthy lifestyle change  Stay on heart healthy diet  Engage in regular exercise daily -30 minutes a day as tolerated  Stay well hydrated - drink at least 64 oz of fluid a day  Eat 6 servings of fruit and vegetables daily  Keep scheduled follow-up appt with new PCP  Call with new concerns      Pt states that his wife has chosen a new PCP for both of them but he does not know who it is    Return in 1 year (on 5/12/2026) for Medicare AWV.     Subjective     PMHx and problem list reviewed. No acute change reported  Chronic meds reviewed and reconciled  BP is noted to be slightly elevated today. Pt is however asymptomatic  GERD stable  He has hx of prediabetes. Last A1c done a year ago was 6.5%  No new problems voiced. He still travels for work.     Patient's complete Health Risk Assessment and screening values have been reviewed and are found in Flowsheets. The following problems were reviewed today and where indicated follow up appointments were made and/or referrals ordered.    Positive Risk Factor Screenings with Interventions:                    Safety:  Do you have non-slip mats or non-slip surfaces or shower bars or grab bars in your shower or bathtub?: (!)

## 2025-06-22 DIAGNOSIS — Z76.0 MEDICATION REFILL: ICD-10-CM

## 2025-06-22 DIAGNOSIS — E78.00 PURE HYPERCHOLESTEROLEMIA: ICD-10-CM

## 2025-06-22 DIAGNOSIS — I10 ESSENTIAL HYPERTENSION: ICD-10-CM

## 2025-06-23 RX ORDER — EZETIMIBE AND SIMVASTATIN 10; 40 MG/1; MG/1
1 TABLET ORAL DAILY
Qty: 30 TABLET | Refills: 0 | Status: SHIPPED | OUTPATIENT
Start: 2025-06-23

## 2025-06-23 NOTE — TELEPHONE ENCOUNTER
Yared Plasencia called to request a refill on his medication.      Last office visit : 5/12/2025   Next office visit : Visit date not found     Requested Prescriptions     Pending Prescriptions Disp Refills    ezetimibe-simvastatin (VYTORIN) 10-40 MG per tablet [Pharmacy Med Name: EZETIMIBE-SIMVASTATIN 10-40 MG] 90 tablet 1     Sig: TAKE 1 TABLET BY MOUTH EVERY DAY            Marianne Domingo MA

## 2025-07-31 ENCOUNTER — TELEPHONE (OUTPATIENT)
Dept: PRIMARY CARE CLINIC | Age: 75
End: 2025-07-31